# Patient Record
Sex: MALE | Race: WHITE
[De-identification: names, ages, dates, MRNs, and addresses within clinical notes are randomized per-mention and may not be internally consistent; named-entity substitution may affect disease eponyms.]

---

## 2022-07-07 ENCOUNTER — HOSPITAL ENCOUNTER (OUTPATIENT)
Dept: HOSPITAL 76 - EMS | Age: 77
End: 2022-07-07
Payer: MEDICARE
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
## 2022-07-07 DIAGNOSIS — M54.9: Primary | ICD-10-CM

## 2022-09-05 ENCOUNTER — HOSPITAL ENCOUNTER (OUTPATIENT)
Dept: HOSPITAL 76 - EMS | Age: 77
Discharge: ANOTHER HOSPITAL | End: 2022-09-05
Payer: MEDICARE

## 2022-09-05 ENCOUNTER — HOSPITAL ENCOUNTER (OUTPATIENT)
Dept: HOSPITAL 76 - EMS | Age: 77
End: 2022-09-05
Payer: MEDICARE

## 2022-09-05 ENCOUNTER — HOSPITAL ENCOUNTER (EMERGENCY)
Dept: HOSPITAL 76 - ED | Age: 77
Discharge: HOME | End: 2022-09-05
Payer: MEDICARE

## 2022-09-05 VITALS — DIASTOLIC BLOOD PRESSURE: 71 MMHG | SYSTOLIC BLOOD PRESSURE: 100 MMHG

## 2022-09-05 DIAGNOSIS — R42: Primary | ICD-10-CM

## 2022-09-05 DIAGNOSIS — R55: Primary | ICD-10-CM

## 2022-09-05 DIAGNOSIS — R63.4: ICD-10-CM

## 2022-09-05 DIAGNOSIS — R33.9: ICD-10-CM

## 2022-09-05 DIAGNOSIS — I10: ICD-10-CM

## 2022-09-05 LAB
ALBUMIN DIAFP-MCNC: 3.8 G/DL (ref 3.2–5.5)
ALBUMIN/GLOB SERPL: 1.1 {RATIO} (ref 1–2.2)
ALP SERPL-CCNC: 69 IU/L (ref 42–121)
ALT SERPL W P-5'-P-CCNC: 31 IU/L (ref 10–60)
ANION GAP SERPL CALCULATED.4IONS-SCNC: 8 MMOL/L (ref 6–13)
AST SERPL W P-5'-P-CCNC: 34 IU/L (ref 10–42)
BASOPHILS NFR BLD AUTO: 0 10^3/UL (ref 0–0.1)
BASOPHILS NFR BLD AUTO: 0.6 %
BILIRUB BLD-MCNC: 0.8 MG/DL (ref 0.2–1)
BUN SERPL-MCNC: 19 MG/DL (ref 6–20)
CALCIUM UR-MCNC: 9.1 MG/DL (ref 8.5–10.3)
CHLORIDE SERPL-SCNC: 97 MMOL/L (ref 101–111)
CLARITY UR REFRACT.AUTO: CLEAR
CO2 SERPL-SCNC: 25 MMOL/L (ref 21–32)
CREAT SERPLBLD-SCNC: 0.8 MG/DL (ref 0.6–1.2)
EOSINOPHIL # BLD AUTO: 0.1 10^3/UL (ref 0–0.7)
EOSINOPHIL NFR BLD AUTO: 2.6 %
ERYTHROCYTE [DISTWIDTH] IN BLOOD BY AUTOMATED COUNT: 15.1 % (ref 12–15)
GFRSERPLBLD MDRD-ARVRAT: 94 ML/MIN/{1.73_M2} (ref 89–?)
GLOBULIN SER-MCNC: 3.6 G/DL (ref 2.1–4.2)
GLUCOSE SERPL-MCNC: 103 MG/DL (ref 70–100)
GLUCOSE UR QL STRIP.AUTO: NEGATIVE MG/DL
HCT VFR BLD AUTO: 29.8 % (ref 42–52)
HGB UR QL STRIP: 10.2 G/DL (ref 14–18)
KETONES UR QL STRIP.AUTO: NEGATIVE MG/DL
LIPASE SERPL-CCNC: 37 U/L (ref 22–51)
LYMPHOCYTES # SPEC AUTO: 0.6 10^3/UL (ref 1.5–3.5)
LYMPHOCYTES NFR BLD AUTO: 17.5 %
MCH RBC QN AUTO: 37.2 PG (ref 27–31)
MCHC RBC AUTO-ENTMCNC: 34.2 G/DL (ref 32–36)
MCV RBC AUTO: 108.8 FL (ref 80–94)
MONOCYTES # BLD AUTO: 0.7 10^3/UL (ref 0–1)
MONOCYTES NFR BLD AUTO: 19.5 %
NEUTROPHILS # BLD AUTO: 2.1 10^3/UL (ref 1.5–6.6)
NEUTROPHILS # SNV AUTO: 3.5 X10^3/UL (ref 4.8–10.8)
NEUTROPHILS NFR BLD AUTO: 59.8 %
NITRITE UR QL STRIP.AUTO: NEGATIVE
NRBC # BLD AUTO: 0 /100WBC
NRBC # BLD AUTO: 0 X10^3/UL
PDW BLD AUTO: 9 FL (ref 7.4–11.4)
PH UR STRIP.AUTO: 6 PH (ref 5–7.5)
PLATELET # BLD: 178 10^3/UL (ref 130–450)
POTASSIUM SERPL-SCNC: 4 MMOL/L (ref 3.5–5)
PROT SPEC-MCNC: 7.4 G/DL (ref 6.7–8.2)
PROT UR STRIP.AUTO-MCNC: NEGATIVE MG/DL
RBC # UR STRIP.AUTO: NEGATIVE /UL
RBC MAR: 2.74 10^6/UL (ref 4.7–6.1)
SODIUM SERPLBLD-SCNC: 130 MMOL/L (ref 135–145)
SP GR UR STRIP.AUTO: 1.01 (ref 1–1.03)
UROBILINOGEN UR QL STRIP.AUTO: (no result) E.U./DL
UROBILINOGEN UR STRIP.AUTO-MCNC: NEGATIVE MG/DL

## 2022-09-05 PROCEDURE — 83690 ASSAY OF LIPASE: CPT

## 2022-09-05 PROCEDURE — 84484 ASSAY OF TROPONIN QUANT: CPT

## 2022-09-05 PROCEDURE — 87086 URINE CULTURE/COLONY COUNT: CPT

## 2022-09-05 PROCEDURE — 85025 COMPLETE CBC W/AUTO DIFF WBC: CPT

## 2022-09-05 PROCEDURE — 83735 ASSAY OF MAGNESIUM: CPT

## 2022-09-05 PROCEDURE — 84443 ASSAY THYROID STIM HORMONE: CPT

## 2022-09-05 PROCEDURE — 85379 FIBRIN DEGRADATION QUANT: CPT

## 2022-09-05 PROCEDURE — 71275 CT ANGIOGRAPHY CHEST: CPT

## 2022-09-05 PROCEDURE — 51798 US URINE CAPACITY MEASURE: CPT

## 2022-09-05 PROCEDURE — 99284 EMERGENCY DEPT VISIT MOD MDM: CPT

## 2022-09-05 PROCEDURE — 81001 URINALYSIS AUTO W/SCOPE: CPT

## 2022-09-05 PROCEDURE — 93005 ELECTROCARDIOGRAM TRACING: CPT

## 2022-09-05 PROCEDURE — 36415 COLL VENOUS BLD VENIPUNCTURE: CPT

## 2022-09-05 PROCEDURE — 80053 COMPREHEN METABOLIC PANEL: CPT

## 2022-09-05 PROCEDURE — 83880 ASSAY OF NATRIURETIC PEPTIDE: CPT

## 2022-09-05 PROCEDURE — 81003 URINALYSIS AUTO W/O SCOPE: CPT

## 2022-09-05 PROCEDURE — 99283 EMERGENCY DEPT VISIT LOW MDM: CPT

## 2022-09-05 NOTE — ED PHYSICIAN DOCUMENTATION
PD HPI SYNCOPE





- Stated complaint


Stated Complaint: DIZZINESS





- Chief complaint


Chief Complaint: Neuro





- History obtained from


History obtained from: Patient, EMS





- History of Present Illness


Witnessed: Witnessed (his wife says she had seem couple of the events, where 

patient is looking pale, not interacting well, but standing and trying to walk 

and then collapses. She has helped him down gently. No noted injuries. He is not

having CP/AP/HA with the episodes.)


Timing - onset: Last night (had syncope after urination last night and again 

this morning. Has had 2 other episodes (one in July and one in Aug) both 

associated with just urinated during night.)


Contributing factors: No: Recent med change (had Ofev added end of June for 

ulmonary fibrosis.)


Injury occurred: No: Fell, Head injury, Neck injury


Recently seen: Clinic (seen for pulmonary fibrosis and on Ofev (fibroblast 

inhibitor) since end June. Saw Cardiologist prior to shoulder surgery and had 

ECHO/stress testing that were normal.), Surgery (right shoulder replacement une 

12, 2020. Has had regular progressive rehab/ROM.)





Review of Systems


Constitutional: denies: Fever, Chills


Eyes: denies: Loss of vision


Nose: denies: Rhinorrhea / runny nose


Throat: denies: Sore throat


Cardiac: denies: Chest pain / pressure, Palpitations, Pedal edema, Calf pain


Respiratory: denies: Dyspnea, Cough


GI: denies: Abdominal Pain, Nausea, Vomiting, Diarrhea, Bloody / black stool


: reports: Frequency, Hesitancy


Skin: denies: Rash


Neurologic: reports: Syncope (just the distinct 4 episodes without any near 

syncope. And each has been just after urination in the night/AM. No feelings of 

it during day.).  denies: Generalized weakness, Focal weakness, Numbness


Endocrine: reports: Weight loss (about 70 lbs in 2 years, with abrupt onset 30 

lb weight loss in 2-3 months and then has been steady regular loss since. 

Started at 220 and is currently 150.)





PD PAST MEDICAL HISTORY





- Past Medical History


Past Medical History: Yes


Cardiovascular: Hypertension (prior history of it but has had improved BP to 

even slightly low at times. Had Lisinopril dose lowered but is still on some 

dose. No BPH meds. )


Respiratory: Other (pulmonary fibrosis. )


Neuro: None


Endocrine/Autoimmune: None


GI: None


: Other (urinary hesitancy, some feeling of incomplete emptying. Urinated 

standing. )


Other Past Medical History: Pulmonary Fibrosis





- Past Surgical History


Past Surgical History: Yes


Ortho: Other (shoulder replacement June 12, 2022)





- Present Medications


Home Medications: 


                                Ambulatory Orders











 Medication  Instructions  Recorded  Confirmed


 


Alfuzosin HCl [Alfuzosin HCl ER] 10 mg PO DAILY 20 Days #20 tab 09/05/22 


 


Nintedanib Esylate [Ofev] 100 mg PO DAILY 09/05/22 09/05/22


 


lisinopriL [Zestril] 5 mg PO DAILY 09/05/22 09/05/22














- Allergies


Allergies/Adverse Reactions: 


                                    Allergies











Allergy/AdvReac Type Severity Reaction Status Date / Time


 


No Known Drug Allergies Allergy   Verified 09/05/22 06:39














- Social History


Does the pt smoke?: No


Smoking Status: Never smoker


Does the pt drink ETOH?: Yes


ETOH Use: Wine


Does the pt have substance abuse?: No





- Immunizations


Immunizations are current?: Yes





- POLST


Patient has POLST: No





PD ED PE NORMAL





- Vitals


Vital signs reviewed: Yes





- General


General: Alert and oriented X 3, No acute distress, Well developed/nourished





- HEENT


HEENT: Moist mucous membranes, Pharynx benign





- Neck


Neck: Supple, no meningeal sign, No adenopathy





- Cardiac


Cardiac: RRR, No murmur





- Respiratory


Respiratory: No respiratory distress, Other (no wheezing. Mild fine dry crackles

 sound diffusely. No rales. )





- Abdomen


Abdomen: Soft, Non tender





- Male 


Male : Deferred





- Rectal


Rectal: Deferred





- Derm


Derm: Normal color, Warm and dry





- Extremities


Extremities: Normal ROM s pain, No edema, No calf tenderness / cord





- Neuro


Neuro: Alert and oriented X 3, No motor deficit, No sensory deficit, Normal 

speech


Eye Opening: Spontaneous


Motor: Obeys Commands


Verbal: Oriented


GCS Score: 15





Results





- Vitals


Vitals: 


                               Vital Signs - 24 hr











  09/05/22 09/05/22 09/05/22





  06:34 08:00 10:52


 


Temperature 37.3 C  


 


Heart Rate 94 90 90


 


Respiratory 20 47 H 39 H





Rate   


 


Blood Pressure 130/93 H 115/76 100/71


 


O2 Saturation 98 100 99








                                     Oxygen











O2 Source                      Room air

















- EKG (time done)


  ** 06:39


Rate: Rate (enter#) (92)


Rhythm: NSR


Intervals: Wide QRS (IVCD with nonspecific ST/T changes)


QRS: Poor R wave progression


Compare to prior EKG: Old EKG unavailable





- Labs


Labs: 


                                Laboratory Tests











  09/05/22 09/05/22 09/05/22





  06:42 06:42 06:42


 


WBC  3.5 L  


 


RBC  2.74 L  


 


Hgb  10.2 L  


 


Hct  29.8 L  


 


MCV  108.8 H  


 


MCH  37.2 H  


 


MCHC  34.2  


 


RDW  15.1 H  


 


Plt Count  178  


 


MPV  9.0  


 


Neut # (Auto)  2.1  


 


Lymph # (Auto)  0.6 L  


 


Mono # (Auto)  0.7  


 


Eos # (Auto)  0.1  


 


Baso # (Auto)  0.0  


 


Absolute Nucleated RBC  0.00  


 


Nucleated RBC %  0.0  


 


D-Dimer   


 


Sodium   130 L 


 


Potassium   4.0 


 


Chloride   97 L 


 


Carbon Dioxide   25 


 


Anion Gap   8.0 


 


BUN   19 


 


Creatinine   0.8 


 


Estimated GFR (MDRD)   94 


 


Glucose   103 H 


 


POC Whole Bld Glucose   


 


Calcium   9.1 


 


Magnesium   


 


Total Bilirubin   0.8 


 


AST   34 


 


ALT   31 


 


Alkaline Phosphatase   69 


 


Troponin I High Sens    50.1 H*


 


B-Natriuretic Peptide   


 


Total Protein   7.4 


 


Albumin   3.8 


 


Globulin   3.6 


 


Albumin/Globulin Ratio   1.1 


 


Lipase   37 


 


TSH   


 


Urine Color   


 


Urine Clarity   


 


Urine pH   


 


Ur Specific Gravity   


 


Urine Protein   


 


Urine Glucose (UA)   


 


Urine Ketones   


 


Urine Occult Blood   


 


Urine Nitrite   


 


Urine Bilirubin   


 


Urine Urobilinogen   


 


Ur Leukocyte Esterase   


 


Ur Microscopic Review   


 


Urine Culture Comments   














  09/05/22 09/05/22 09/05/22





  06:43 07:56 07:56


 


WBC   


 


RBC   


 


Hgb   


 


Hct   


 


MCV   


 


MCH   


 


MCHC   


 


RDW   


 


Plt Count   


 


MPV   


 


Neut # (Auto)   


 


Lymph # (Auto)   


 


Mono # (Auto)   


 


Eos # (Auto)   


 


Baso # (Auto)   


 


Absolute Nucleated RBC   


 


Nucleated RBC %   


 


D-Dimer   


 


Sodium   


 


Potassium   


 


Chloride   


 


Carbon Dioxide   


 


Anion Gap   


 


BUN   


 


Creatinine   


 


Estimated GFR (MDRD)   


 


Glucose   


 


POC Whole Bld Glucose  101 H  


 


Calcium   


 


Magnesium   2.1 


 


Total Bilirubin   


 


AST   


 


ALT   


 


Alkaline Phosphatase   


 


Troponin I High Sens   


 


B-Natriuretic Peptide    280 H


 


Total Protein   


 


Albumin   


 


Globulin   


 


Albumin/Globulin Ratio   


 


Lipase   


 


TSH   


 


Urine Color   


 


Urine Clarity   


 


Urine pH   


 


Ur Specific Gravity   


 


Urine Protein   


 


Urine Glucose (UA)   


 


Urine Ketones   


 


Urine Occult Blood   


 


Urine Nitrite   


 


Urine Bilirubin   


 


Urine Urobilinogen   


 


Ur Leukocyte Esterase   


 


Ur Microscopic Review   


 


Urine Culture Comments   














  09/05/22 09/05/22 09/05/22





  07:56 07:56 07:56


 


WBC   


 


RBC   


 


Hgb   


 


Hct   


 


MCV   


 


MCH   


 


MCHC   


 


RDW   


 


Plt Count   


 


MPV   


 


Neut # (Auto)   


 


Lymph # (Auto)   


 


Mono # (Auto)   


 


Eos # (Auto)   


 


Baso # (Auto)   


 


Absolute Nucleated RBC   


 


Nucleated RBC %   


 


D-Dimer  611.0 H  


 


Sodium   


 


Potassium   


 


Chloride   


 


Carbon Dioxide   


 


Anion Gap   


 


BUN   


 


Creatinine   


 


Estimated GFR (MDRD)   


 


Glucose   


 


POC Whole Bld Glucose   


 


Calcium   


 


Magnesium   


 


Total Bilirubin   


 


AST   


 


ALT   


 


Alkaline Phosphatase   


 


Troponin I High Sens   49.2 H* 


 


B-Natriuretic Peptide   


 


Total Protein   


 


Albumin   


 


Globulin   


 


Albumin/Globulin Ratio   


 


Lipase   


 


TSH    1.81


 


Urine Color   


 


Urine Clarity   


 


Urine pH   


 


Ur Specific Gravity   


 


Urine Protein   


 


Urine Glucose (UA)   


 


Urine Ketones   


 


Urine Occult Blood   


 


Urine Nitrite   


 


Urine Bilirubin   


 


Urine Urobilinogen   


 


Ur Leukocyte Esterase   


 


Ur Microscopic Review   


 


Urine Culture Comments   














  09/05/22





  08:33


 


WBC 


 


RBC 


 


Hgb 


 


Hct 


 


MCV 


 


MCH 


 


MCHC 


 


RDW 


 


Plt Count 


 


MPV 


 


Neut # (Auto) 


 


Lymph # (Auto) 


 


Mono # (Auto) 


 


Eos # (Auto) 


 


Baso # (Auto) 


 


Absolute Nucleated RBC 


 


Nucleated RBC % 


 


D-Dimer 


 


Sodium 


 


Potassium 


 


Chloride 


 


Carbon Dioxide 


 


Anion Gap 


 


BUN 


 


Creatinine 


 


Estimated GFR (MDRD) 


 


Glucose 


 


POC Whole Bld Glucose 


 


Calcium 


 


Magnesium 


 


Total Bilirubin 


 


AST 


 


ALT 


 


Alkaline Phosphatase 


 


Troponin I High Sens 


 


B-Natriuretic Peptide 


 


Total Protein 


 


Albumin 


 


Globulin 


 


Albumin/Globulin Ratio 


 


Lipase 


 


TSH 


 


Urine Color  YELLOW


 


Urine Clarity  CLEAR


 


Urine pH  6.0


 


Ur Specific Gravity  1.010


 


Urine Protein  NEGATIVE


 


Urine Glucose (UA)  NEGATIVE


 


Urine Ketones  NEGATIVE


 


Urine Occult Blood  NEGATIVE


 


Urine Nitrite  NEGATIVE


 


Urine Bilirubin  NEGATIVE


 


Urine Urobilinogen  0.2 (NORMAL)


 


Ur Leukocyte Esterase  NEGATIVE


 


Ur Microscopic Review  NOT INDICATED


 


Urine Culture Comments  NOT INDICATED














- Rads (name of study)


  ** chest CTA


Radiology: Prelim report reviewed (no emboli. fibrosis changes noted. ), See rad

 report





PD MEDICAL DECISION MAKING





- ED course


Complexity details: reviewed results (some troponin leak/bump that is flat on 

repeat, most c/w tranient hypotension or perhaps dysrhythmia. Does not seem like

 AMI. ECG showing IVCD, and no priors available. But patient had had 

ECHO/nuclear stress normal less than 3 months ago. ), re-evaluated patient (had 

urinary retention and likely chronic. Post void is not too bad at 249 ml, so 

defer aceves for now. ), considered differential (micturation syncope. Has had 4 

distinct episodes without lightheaded other times urinating. No CP. Has abnormal

 ECG IVCD, but had normal ECHO/stress test in May 2022 pre-op for shoulder.  No 

exertional CP but does have dyspnea. Has BPH symptoms. ), d/w patient


ED course: 





He has had 4 distinct episodes of micturition syncope during the night.  He 

states his blood pressure has been running lower the last 6 months or more and 

is still on lisinopril low-dose.  He does not take any prostate medicines but 

describes incomplete emptying and frequency.  I presume he is having a more 

pronounced vasovagal symptoms with urination on those particular episodes.  He 

has not had any episodes without urination.





At this point I would consider stopping his lisinopril.  I would suggest he 

urinate during the night in a sitting position rather than standing and wait a 

few minutes after before getting up.





These episodes have happened postoperatively for shoulder repair.  He has not 

had any leg edema or swelling but consider episodic PEs.  He has underlying 

dyspnea from the fibrosis so could be harder to tell.  Can test for those.  Or 

screen for it with the D-dimer.  We will also screen for heart failure or heart 

attack though less likely in the setting of recent stress test etc.





He has had weight loss over the last couple of years or so.  He has had basic 

normal testing for metabolic problems.  I would suggest he follow-up with his 

primary care for malabsorption syndrome type issues.





The distinct episodes of fainting may also suggest the idea of a Holter or Zio 

patch type monitoring.  He is only had a few episodes scattered to a month apart

 so would need to be a longer term 1 month type patch or so.  It seems unusual 

that these would have happened just with urination but intermittent dysrhythmia 

would still be a consideration.





The initial troponin was slightly elevated at 50 but in the setting of a 

syncopal episode, could be just a myocardial leak from low blood pressure.  We 

will recheck it at an interval of 1 to 2 hours and see if its a dramatic rise.





Departure





- Departure


Disposition: 01 Home, Self Care


Clinical Impression: 


 Micturition syncope, Urinary retention, Weight loss





Syncopal episodes


Qualifiers:


 Syncope type: vasovagal syncope Qualified Code(s): R55 - Syncope and collapse





Condition: Stable


Record reviewed to determine appropriate education?: Yes


Instructions:  ED Fainting Unkn Cause


Follow-Up: 


Alejandrina Terry MD [Primary Care Provider] - 


Gadiel Ramos MD [Physician No Access] - 


Prescriptions: 


Alfuzosin HCl [Alfuzosin HCl ER] 10 mg PO DAILY 20 Days #20 tab


Comments: 


Your CT scan shows the fibrosis that is known.  No signs of blood clots.  The 

radiologist does make mention of an enlarged pulmonary artery that may be 

suggestive of pulmonary artery hypertension.  That should have been noted on 

your echocardiogram done by the cardiologist preop.  You can have your primary 

care track down the echo report and see if there was any measured pulmonary 

artery hypertension.  Otherwise consider likely a normal variant.





The fainting episodes coming along with after urinating are likely a feedback 

response from the pressure in the bladder being released causing a transient 

drop in blood pressure or heart rate (called a vasovagal response).





This will cause a diminished blood flow and blood pressure transiently and 

therefore fainting and can even cause a mild stress on the other organs (as 

evidenced by a slight bump in your troponin heart muscle marker on blood tests 

here).





I would have several suggestions:


1.  I would stop your lisinopril as your blood pressure sounds fine and does not

need the added effect of lowering blood pressure


2.  I would suggest urinating during the night sitting down and waiting for a 

few minutes before getting up to diminish the potential for the drop in blood 

pressure in the "gravity effect" of standing up.


3.:  You do have urinary retention and incomplete emptying which may be adding 

to the effect at times.  I would suggest starting a low-dose prostate medicine 

until following up with your primary care to further investigate.  You could 

also follow-up with a urologist for further evaluation.





Regarding the weight loss, consideration could be for dietary malabsorption and 

you could discuss with your primary care whether to do some stool studies or 

further evaluation to see if there is conditions such as gluten intolerance, 

fatty stool, inflammatory conditions such as Crohn's or colitis etc.


Discharge Date/Time: 09/05/22 10:53

## 2022-09-05 NOTE — CT REPORT
PROCEDURE:  ANGIO CHEST W/WO

 

INDICATIONS:  syncope twice last night; elevated d-dimer

 

CONTRAST:  IV CONTRAST: Optiray 320 ml: 80 PO CONTRAST: *NO PO CONTRAST  

 

TECHNIQUE:  

After the administration of intravenous contrast, 2 mm axial images were acquired from the pulmonary 
apices to the posterior costophrenic angles during the arterial phase. In addition, 1 mm lung kernel 
and 5 mm soft tissue kernel reconstructions were performed. 3-dimensional coronal oblique maximum int
ensity projection (MIP) reformats, 8 mm axial MIP, and 5 mm coronal and sagittal MPR reformats were t
hen performed through the thorax. For radiation dose reduction, the following was used: automated exp
osure control, adjustment of mA and/or kV according to patient size.

 

COMPARISON:  None.

 

FINDINGS:  

Image quality:  Excellent.  

 

Pulmonary arteries:  Main pulmonary artery is enlarged, measuring up to 4.3 cm in diameter. No intral
uminal filling defects to suggest central pulmonary embolism.  

 

Lungs and pleura: There is elevation of the left hemidiaphragm. Bilateral subpleural reticulations an
d traction bronchiectasis are seen. Areas of subsegmental atelectasis are also noted. No definite hon
eycombing is seen. However, finding are mildly obscured by respiratory motion. No pleural effusions o
r pneumothorax.  Central and peripheral airways are patent.  

 

Mediastinum:  Heart size is normal, without pericardial effusion. Mild coronary artery calcifications
. No mediastinal or hilar adenopathy.  Thoracic aorta is normal in caliber and enhancement.  Esophagu
s is normal in caliber, without hiatal hernia.  

 

Bones and chest wall: Postoperative changes are seen from a reverse right total shoulder arthroplasty
 with associated metallic streak artifact. Degenerative changes are seen in the spine including multi
level anterior bridging osteophytes or syndesmophytes. Scoliotic curvature of the thoracolumbar spine
. No suspicious bony lesions.  Ribs and thoracic spine appear intact throughout.  No axillary or supr
aclavicular adenopathy.  The thyroid is normal in size and there are no incidental findings.

 

Abdomen:  Visualized upper abdominal solid organs are unremarkable.

 

IMPRESSION:  

1.No acute pulmonary embolus.

2.Bilateral subpleural reticulations and traction bronchiectasis are suspicious for chronic interstit
ial lung disease.

3.Main pulmonary artery is enlarged 4.3 cm, which can be seen in setting of pulmonary arterial hypert
ension.

 

Reviewed by: Manas Adame MD on 9/5/2022 9:14 AM [facility]ENCE

Approved by: Manas Adame MD on 9/5/2022 9:14 AM ESSENCE

 

 

Station ID:  [id number]

## 2022-12-21 ENCOUNTER — HOSPITAL ENCOUNTER (OUTPATIENT)
Dept: HOSPITAL 76 - EMS | Age: 77
End: 2022-12-21
Payer: MEDICARE

## 2022-12-21 DIAGNOSIS — Z03.89: Primary | ICD-10-CM

## 2023-01-14 ENCOUNTER — HOSPITAL ENCOUNTER (OUTPATIENT)
Dept: HOSPITAL 76 - EMS | Age: 78
Discharge: ANOTHER HOSPITAL | End: 2023-01-14
Payer: MEDICARE

## 2023-01-14 ENCOUNTER — HOSPITAL ENCOUNTER (INPATIENT)
Dept: HOSPITAL 76 - ED | Age: 78
LOS: 2 days | Discharge: HOME | DRG: 312 | End: 2023-01-16
Attending: INTERNAL MEDICINE | Admitting: INTERNAL MEDICINE
Payer: MEDICARE

## 2023-01-14 DIAGNOSIS — I27.29: ICD-10-CM

## 2023-01-14 DIAGNOSIS — I11.0: Primary | ICD-10-CM

## 2023-01-14 DIAGNOSIS — I27.81: ICD-10-CM

## 2023-01-14 DIAGNOSIS — I45.10: ICD-10-CM

## 2023-01-14 DIAGNOSIS — I95.1: ICD-10-CM

## 2023-01-14 DIAGNOSIS — L89.619: ICD-10-CM

## 2023-01-14 DIAGNOSIS — R55: Primary | ICD-10-CM

## 2023-01-14 DIAGNOSIS — Z66: ICD-10-CM

## 2023-01-14 DIAGNOSIS — R39.14: ICD-10-CM

## 2023-01-14 DIAGNOSIS — N40.1: ICD-10-CM

## 2023-01-14 DIAGNOSIS — Z20.822: ICD-10-CM

## 2023-01-14 DIAGNOSIS — J84.10: ICD-10-CM

## 2023-01-14 DIAGNOSIS — L89.629: ICD-10-CM

## 2023-01-14 DIAGNOSIS — F10.10: ICD-10-CM

## 2023-01-14 DIAGNOSIS — K21.9: ICD-10-CM

## 2023-01-14 DIAGNOSIS — L89.329: ICD-10-CM

## 2023-01-14 DIAGNOSIS — D53.9: ICD-10-CM

## 2023-01-14 DIAGNOSIS — I11.9: ICD-10-CM

## 2023-01-14 DIAGNOSIS — D61.818: ICD-10-CM

## 2023-01-14 DIAGNOSIS — I50.9: ICD-10-CM

## 2023-01-14 DIAGNOSIS — R94.31: ICD-10-CM

## 2023-01-14 LAB
ALBUMIN DIAFP-MCNC: 3.3 G/DL (ref 3.2–5.5)
ALBUMIN/GLOB SERPL: 1 {RATIO} (ref 1–2.2)
ALP SERPL-CCNC: 69 IU/L (ref 42–121)
ALT SERPL W P-5'-P-CCNC: 30 IU/L (ref 10–60)
ANION GAP SERPL CALCULATED.4IONS-SCNC: 8 MMOL/L (ref 6–13)
AST SERPL W P-5'-P-CCNC: 36 IU/L (ref 10–42)
BASOPHILS NFR BLD AUTO: 0 10^3/UL (ref 0–0.1)
BASOPHILS NFR BLD AUTO: 0.6 %
BILIRUB BLD-MCNC: 1 MG/DL (ref 0.2–1)
BUN SERPL-MCNC: 29 MG/DL (ref 6–20)
CALCIUM UR-MCNC: 8.5 MG/DL (ref 8.5–10.3)
CASTS URNS QL MICRO: (no result) /LPF
CHLORIDE SERPL-SCNC: 108 MMOL/L (ref 101–111)
CLARITY UR REFRACT.AUTO: CLEAR
CO2 SERPL-SCNC: 22 MMOL/L (ref 21–32)
CREAT SERPLBLD-SCNC: 1.2 MG/DL (ref 0.6–1.2)
EOSINOPHIL # BLD AUTO: 0.1 10^3/UL (ref 0–0.7)
EOSINOPHIL NFR BLD AUTO: 2.9 %
ERYTHROCYTE [DISTWIDTH] IN BLOOD BY AUTOMATED COUNT: 16.3 % (ref 12–15)
GFRSERPLBLD MDRD-ARVRAT: 59 ML/MIN/{1.73_M2} (ref 89–?)
GLOBULIN SER-MCNC: 3.4 G/DL (ref 2.1–4.2)
GLUCOSE SERPL-MCNC: 154 MG/DL (ref 70–100)
GLUCOSE UR QL STRIP.AUTO: NEGATIVE MG/DL
HCT VFR BLD AUTO: 34.2 % (ref 42–52)
HGB UR QL STRIP: 11 G/DL (ref 14–18)
KETONES UR QL STRIP.AUTO: NEGATIVE MG/DL
LIPASE SERPL-CCNC: 54 U/L (ref 22–51)
LYMPHOCYTES # SPEC AUTO: 0.7 10^3/UL (ref 1.5–3.5)
LYMPHOCYTES NFR BLD AUTO: 21.7 %
MCH RBC QN AUTO: 38.6 PG (ref 27–31)
MCHC RBC AUTO-ENTMCNC: 32.2 G/DL (ref 32–36)
MCV RBC AUTO: 120 FL (ref 80–94)
MONOCYTES # BLD AUTO: 0.3 10^3/UL (ref 0–1)
MONOCYTES NFR BLD AUTO: 11 %
NEUTROPHILS # BLD AUTO: 2 10^3/UL (ref 1.5–6.6)
NEUTROPHILS # SNV AUTO: 3.1 X10^3/UL (ref 4.8–10.8)
NEUTROPHILS NFR BLD AUTO: 63.8 %
NITRITE UR QL STRIP.AUTO: NEGATIVE
NRBC # BLD AUTO: 0 /100WBC
NRBC # BLD AUTO: 0 X10^3/UL
PDW BLD AUTO: 10 FL (ref 7.4–11.4)
PH UR STRIP.AUTO: 5.5 PH (ref 5–7.5)
PLATELET # BLD: 119 10^3/UL (ref 130–450)
PLATELET BLD QL SMEAR: (no result)
POTASSIUM SERPL-SCNC: 4.5 MMOL/L (ref 3.5–5)
PROT SPEC-MCNC: 6.7 G/DL (ref 6.7–8.2)
PROT UR STRIP.AUTO-MCNC: NEGATIVE MG/DL
RBC # UR STRIP.AUTO: NEGATIVE /UL
RBC MAR: 2.85 10^6/UL (ref 4.7–6.1)
RBC MORPH BLD: (no result)
SODIUM SERPLBLD-SCNC: 138 MMOL/L (ref 135–145)
SP GR UR STRIP.AUTO: 1.01 (ref 1–1.03)
SQUAMOUS URNS QL MICRO: (no result)
UROBILINOGEN UR QL STRIP.AUTO: (no result) E.U./DL
UROBILINOGEN UR STRIP.AUTO-MCNC: NEGATIVE MG/DL
WBC # UR MANUAL: (no result) /HPF (ref 0–3)

## 2023-01-14 PROCEDURE — 81001 URINALYSIS AUTO W/SCOPE: CPT

## 2023-01-14 PROCEDURE — 82746 ASSAY OF FOLIC ACID SERUM: CPT

## 2023-01-14 PROCEDURE — 99285 EMERGENCY DEPT VISIT HI MDM: CPT

## 2023-01-14 PROCEDURE — 85610 PROTHROMBIN TIME: CPT

## 2023-01-14 PROCEDURE — 80048 BASIC METABOLIC PNL TOTAL CA: CPT

## 2023-01-14 PROCEDURE — 94761 N-INVAS EAR/PLS OXIMETRY MLT: CPT

## 2023-01-14 PROCEDURE — 84484 ASSAY OF TROPONIN QUANT: CPT

## 2023-01-14 PROCEDURE — 87635 SARS-COV-2 COVID-19 AMP PRB: CPT

## 2023-01-14 PROCEDURE — 82607 VITAMIN B-12: CPT

## 2023-01-14 PROCEDURE — 97163 PT EVAL HIGH COMPLEX 45 MIN: CPT

## 2023-01-14 PROCEDURE — 85025 COMPLETE CBC W/AUTO DIFF WBC: CPT

## 2023-01-14 PROCEDURE — 87086 URINE CULTURE/COLONY COUNT: CPT

## 2023-01-14 PROCEDURE — 71045 X-RAY EXAM CHEST 1 VIEW: CPT

## 2023-01-14 PROCEDURE — 96374 THER/PROPH/DIAG INJ IV PUSH: CPT

## 2023-01-14 PROCEDURE — 83735 ASSAY OF MAGNESIUM: CPT

## 2023-01-14 PROCEDURE — 84466 ASSAY OF TRANSFERRIN: CPT

## 2023-01-14 PROCEDURE — 83540 ASSAY OF IRON: CPT

## 2023-01-14 PROCEDURE — 80053 COMPREHEN METABOLIC PANEL: CPT

## 2023-01-14 PROCEDURE — 36415 COLL VENOUS BLD VENIPUNCTURE: CPT

## 2023-01-14 PROCEDURE — 83880 ASSAY OF NATRIURETIC PEPTIDE: CPT

## 2023-01-14 PROCEDURE — 93005 ELECTROCARDIOGRAM TRACING: CPT

## 2023-01-14 PROCEDURE — 83690 ASSAY OF LIPASE: CPT

## 2023-01-14 RX ADMIN — MIDODRINE HYDROCHLORIDE SCH MG: 2.5 TABLET ORAL at 17:26

## 2023-01-14 RX ADMIN — DILTIAZEM HYDROCHLORIDE SCH EACH: 120 CAPSULE, COATED, EXTENDED RELEASE ORAL at 17:25

## 2023-01-14 RX ADMIN — SODIUM CHLORIDE, PRESERVATIVE FREE SCH ML: 5 INJECTION INTRAVENOUS at 17:47

## 2023-01-14 NOTE — ED PHYSICIAN DOCUMENTATION
PD HPI SYNCOPE





- Stated complaint


Stated Complaint: SYNCOPE





- Chief complaint


Chief Complaint: Neuro





- History obtained from


History obtained from: Patient, Family





- History of Present Illness


Witnessed: Witnessed


Timing - onset: How many minutes ago (approximately 30 minutes PTA)


Duration: Minutes


Preceding symptoms: None


Associated symptoms: None


Contributing factors: Other (had just sat up but not yet stood (see narrative 

below))


Injury occurred: None


Pain level max: 0


Pain level now: 0


Recently seen: Not recently seen





- Additional information


Additional information: 





HPI is from patient as well as from patient's wife (who is in the ED at 

patient's bedside).  Information from patient's wife is helpful due to patient 

having had complete LOC and thus cannot recall the entirety of the events but 

resulted in this visit.


Patient's wife called 911 due to patient having a syncopal episode.  Patient and

his wife say that he has had many episodes of syncope and near syncope in the 

past and thus knows that when he goes to stand up from lying down, he needs to 

first sit up on the edge of the bed and wait for 1 to 2 minutes to make sure he 

Is not feeling lightheaded.  He then normally would transition over to a bedside

commode.


The patient does recall having the urge to urinate and sitting up on the edge of

the bed.  The patient says the next thing he remembers, is the EMTs standing 

over him.  Patient's wife says that she was in bed next to the patient when she 

heard the sound of his cane being knocked over; she looked over to find the 

patient was on the edge of the bed hunched over and barely able to support 

himself.  She thus came around to the side of the bed to help support patient, 

noting that he was barely responding verbally and that he felt like he was "dead

weight".  She says that very rapidly he became unresponsive as she maneuvered 

him to gradually get him back into the bed with his legs back into the bed as 

well.  She then called 911 and while waiting for EMS was instructed by 911 to 

start CPR, which the patient's wife did.


EMS arrived to find patient was awake and alert and responding with blood pr

essures in the 100-110 range (systolic) and regular rate and rhythm on cardiac 

monitor, heart rate 60s-70s. FSBS by EMS was 121.


On my HPI, patient says he feels like he is at baseline regarding ongoing 

symptoms (such as dyspnea associated with his pulmonary fibrosis). He denies 

headache, neck pain, chest pain.


Patient was treated released from this emergency department September 2022 for 

similar symptoms with testing at that time that was unremarkable and 

nondiagnostic.  Notably, at that time, he had a mildly elevated troponin which 

was unchanged on a 2-hour repeat.


Patient wife notes that he has had a new onset of slowly progressive bilateral 

lower extremity edema over the past 3 weeks but has not had this before. 


Patient does not use oxygen at home.  Patient's wife says his pulse ox generally

runs around 95% on room air.





Review of Systems


Constitutional: denies: Fever


Cardiac: reports: Pedal edema.  denies: Chest pain / pressure, Palpitations


Respiratory: reports: Dyspnea


GI: denies: Abdominal Pain


Skin: reports: Other (cyanosis of hands and feet ; patient indicates this has 

been present for many years, no diagnosis/cause has been determined)


Neurologic: reports: Generalized weakness, Syncope.  denies: Focal weakness, 

Numbness, Headache





PD PAST MEDICAL HISTORY





- Past Medical History


Past Medical History: Yes


Cardiovascular: Hypertension


Respiratory: Other


Neuro: None


Endocrine/Autoimmune: None


GI: None


: Other





- Past Surgical History


Past Surgical History: Yes


Ortho: Other





- Present Medications


Home Medications: 


                                Ambulatory Orders











 Medication  Instructions  Recorded  Confirmed


 


Nintedanib Esylate [Ofev] 100 mg PO BID 09/05/22 01/14/23














- Allergies


Allergies/Adverse Reactions: 


                                    Allergies











Allergy/AdvReac Type Severity Reaction Status Date / Time


 


No Known Drug Allergies Allergy   Verified 01/14/23 05:08














- Social History


Does the pt smoke?: No


Smoking Status: Never smoker


Does the pt drink ETOH?: Yes


ETOH Use: Wine


Does the pt have substance abuse?: No





- Immunizations


Immunizations are current?: Yes





- POLST


Patient has POLST: No





PD ED PE NORMAL





- Vitals


Vital signs reviewed: Yes





- General


General: Alert and oriented X 3, No acute distress, Well developed/nourished





- HEENT


HEENT: PERRL, EOMI, Moist mucous membranes





- Cardiac


Cardiac: RRR





- Respiratory


Respiratory: No respiratory distress, Other (bilateral course breath sounds in 

all lung fields)





- Abdomen


Abdomen: Soft, Non tender





- Derm


Derm: Other (mild cyanosis in fingertips, toes)





- Neuro


Neuro: Alert and oriented X 3, CNs 2-12 intact, No motor deficit, No sensory 

deficit, Normal speech


Eye Opening: Spontaneous


Motor: Obeys Commands


Verbal: Oriented


GCS Score: 15





- Psych


Psych: Normal mood, Normal affect





PD ED PE EXPANDED





- Extremities


Extremities: Other (Bilateral pitting lower extremity edema from hips down to 

toes, 3+.)





Results





- Vitals


Vitals: 


                               Vital Signs - 24 hr











  01/14/23 01/14/23 01/14/23





  05:21 07:38 09:48


 


Heart Rate 97 92 86


 


Respiratory 28 H 25 H 23





Rate   


 


Blood Pressure 106/94 H 122/93 H 124/86 H


 


O2 Saturation 93 95 95


 


If not protocol   





: Oxygen Flow,   





liters/minute   














  01/14/23





  11:00


 


Heart Rate 88


 


Respiratory 22





Rate 


 


Blood Pressure 132/93 H


 


O2 Saturation 99


 


If not protocol 2





: Oxygen Flow, 





liters/minute 








                                     Oxygen











O2 Source                      Nasal cannula


 


Oxygen Flow Rate               2

















- EKG (time done)


  ** No standard instances


Rate: Rate (enter#) (97)


Rhythm: NSR


Axis: Anterior hemiblock (LAFB)


Intervals: Wide QRS (NSIVCD)


Ischemia: Non specific changes


Compare to prior EKG: Unchanged from prior EKG (no significant change compared 

to 9/5/22)





- Labs


Labs: 


                                Laboratory Tests











  01/14/23 01/14/23 01/14/23





  04:58 04:58 04:58


 


WBC  3.1 L  


 


RBC  2.85 L  


 


Hgb  11.0 L  


 


Hct  34.2 L  


 


MCV  120.0 H  


 


MCH  38.6 H  


 


MCHC  32.2  


 


RDW  16.3 H  


 


Plt Count  119 L  


 


MPV  10.0  


 


Neut # (Auto)  2.0  


 


Lymph # (Auto)  0.7 L  


 


Mono # (Auto)  0.3  


 


Eos # (Auto)  0.1  


 


Baso # (Auto)  0.0  


 


Absolute Nucleated RBC  0.00  


 


Nucleated RBC %  0.0  


 


Manual Slide Review  Indicated  


 


Platelet Estimate  DECREASED (<130,000)  


 


RBC Morph Micro Appear  1+ HYPOCHROMASIA  


 


Sodium   138 


 


Potassium   4.5 


 


Chloride   108 


 


Carbon Dioxide   22 


 


Anion Gap   8.0 


 


BUN   29 H 


 


Creatinine   1.2 


 


Estimated GFR (MDRD)   59 L 


 


Glucose   154 H 


 


Calcium   8.5 


 


Total Bilirubin   1.0 


 


AST   36 


 


ALT   30 


 


Alkaline Phosphatase   69 


 


Troponin I High Sens    70.8 H*


 


B-Natriuretic Peptide   


 


Total Protein   6.7 


 


Albumin   3.3 


 


Globulin   3.4 


 


Albumin/Globulin Ratio   1.0 


 


Lipase   54 H 














  01/14/23 01/14/23





  04:58 07:32


 


WBC  


 


RBC  


 


Hgb  


 


Hct  


 


MCV  


 


MCH  


 


MCHC  


 


RDW  


 


Plt Count  


 


MPV  


 


Neut # (Auto)  


 


Lymph # (Auto)  


 


Mono # (Auto)  


 


Eos # (Auto)  


 


Baso # (Auto)  


 


Absolute Nucleated RBC  


 


Nucleated RBC %  


 


Manual Slide Review  


 


Platelet Estimate  


 


RBC Morph Micro Appear  


 


Sodium  


 


Potassium  


 


Chloride  


 


Carbon Dioxide  


 


Anion Gap  


 


BUN  


 


Creatinine  


 


Estimated GFR (MDRD)  


 


Glucose  


 


Calcium  


 


Total Bilirubin  


 


AST  


 


ALT  


 


Alkaline Phosphatase  


 


Troponin I High Sens   73.7 H*


 


B-Natriuretic Peptide  1795 H 


 


Total Protein  


 


Albumin  


 


Globulin  


 


Albumin/Globulin Ratio  


 


Lipase  














PD Medical Decision Making





- ED course


Complexity details: reviewed old records, reviewed results, re-evaluated 

patient, considered differential, d/w patient, d/w family


ED course: 





Blood tests ordered and results reviewed by me: CBC, ER abdominal panel, 

troponin, BNP. On the CBC, mild pancytopenia is noted with white and red blood 

cell counts comparable to previous.  His previous platelet count(September 5, 2022) was normal; on tonight's CBC is mildly low at 119.


He has a troponin that is elevated to 70.8.  Mostly unremarkable results on ER 

abdominal panel; he does have a elevated BUN with a normal creatinine.  His BNP 

is significantly elevated, over 1700, with the only previous comparison from the

 visit this past September which was 280.


A repeat troponin is ordered and result pending at end of my shift. Care of 

patient turned over to Dr. Hudson





Departure





- Departure


Disposition: ED Place in Observation


Clinical Impression: 


Syncope


Qualifiers:


 Syncope type: unspecified Qualified Code(s): R55 - Syncope and collapse





CHF (congestive heart failure)


Qualifiers:


 Heart failure type: unspecified Heart failure chronicity: acute Qualified 

Code(s): I50.9 - Heart failure, unspecified





Discharge Date/Time: 01/14/23 14:30

## 2023-01-14 NOTE — PROVIDER PROGRESS NOTE
Hospitalist Cross-cover Note





- Cross-Cover Note


Cross-Cover Note: 





As a board-certified Cardiologist, credentialed to perform echoes, I did a 

bedside Echo evaluation.





Indication: Syncope and pulmonary fibrosis and shortness of breath.





The Echo showed:


Dilated left atrium mildly, severely dilated right atrium.


Upper limit of normal aortic root diameter with mural atherosclerosis


Normal left ventricular size and wall thickness, there is flattened and concave 

appearance of the interventricular septum, caused by compression from right 

ventricular overload.  Normal LV systolic function with some asynchrony from his

bundle branch block.  LVEF 55-60%.


Right ventricle is severely dilated (approximately 4-6 times the size of his 

left ventricle), normal RV wall thickness, severely depressed RV contractility


The aortic valve has mild sclerosis, mitral and tricuspid valves appear 

structurally normal.  Pulmonic valve poorly seen.


Doppler exam of the tricuspid valve only was done, and showed moderate tricuspid

regurgitation.  Pulmonary artery systolic pressure calculated at 100 mmHg- 

severe pulm HTN present.


No pericardial effusion seen





Final Impression:


Biatrial enlargement, right greater than left


Severely dilated right ventricle with severely depressed RV contractility. This 

is consistent with severe cor pulmonale.  Severe pulmonary hypertension present.


LV septal flattening is seen consistent with severe RV overload.  


LV systolic function is overall normal with EF 55-60%.

## 2023-01-14 NOTE — ED PHYSICIAN DOCUMENTATION
ED Addendum





- Addendum


Addendum: 





01/14/23 12:19


77-year-old male with a history of pulmonary fibrosis has had a syncopal episode

this morning of a prolonged nature.  He has had syncopal episodes previously 

usually associated with micturition.  He has had a Holter monitor study which 

demonstrated rare runs of both nonsustained V. tach and SVT.  At shift change 

his care is turned over to me by Dr. Zhang.  On my evaluation I found the 

patient had evidence of acute congestive heart failure.  He has had symptoms for

the past 3 weeks of increasing exertional dyspnea and lower extremity swelling 

and this is progressed to where his legs are now swollen up to his hips.  He is 

no longer able to lay flat in bed.  He is short of breath walking across the 

room.  I evaluated the patient's inferior vena cava and found it to be 2.62 cm 

and plethoric consistent with acute failure.  The patient was administered 

intravenous lasix with good results. I reviewed the patient's chest x-ray and 

found he had a considerable change from a CT  image done in September.  He 

has significant pulmonary edema in the lower lobes of his lungs.  The patient 

has not been diagnosed with congestive heart failure previously, he has had a 

prolonged syncopal episode today and has prior evidence of arrhythmia.  I 

consider these 3 components concerning for a potential worse outcome by 

attempting to treat this as an outpatient I have asked her hospitalist to put 

the patient in the hospital for continued diuresis and observation.

## 2023-01-14 NOTE — XRAY REPORT
PROCEDURE:  Chest 1 View X-Ray

 

INDICATIONS:  chest pain

 

TECHNIQUE:  One view of the chest was acquired.  

 

COMPARISON:  Correlation is made with prior chest CT, 9/5/2022.

 

FINDINGS:  

 

Surgical changes and devices:  Right shoulder arthroplasty is partially seen.

 

Lungs and pleura:  The lung volumes can be seen. Generalized interstitial prominence can be seen. The
re is blunting of the costophrenic angles. No pneumothorax is seen.

 

Mediastinum:  Mediastinal contours appear normal.  Heart size is moderately enlarged.  

 

Bones and chest wall:  No suspicious bony lesions.  Age-appropriate degenerative changes are seen. Mi
ld dextroconvex scoliotic curvature is seen.   Overlying soft tissues appear unremarkable.  

 

 

IMPRESSION:  

 

Cardiomegaly with interstitial prominence and apparent small pleural effusions. CHF is suspected.

 

Low lung volumes.

 

Postoperative and degenerative changes are seen.  

 

Reviewed by: Madhav Westfall MD on 1/14/2023 8:19 AM [facility]

Approved by: Madhav Westfall MD on 1/14/2023 8:19 AM [facility]

 

 

Station ID:  [id number]

## 2023-01-14 NOTE — HISTORY & PHYSICAL EXAMINATION
History of Present Illness





- Admitted From


Admitted From:: ED





- History Obtained From


History obtained from: ED provider and patient and his wife in room.





- History of Present Illness


HPI Comment/Other: 


This is a 77-year-old white male with a history of pulmonary fibrosis Dx 76 mos 

ago and on Ofev and followed by Dr Pruitt (Pulmonologist in [place]), BPH 

previously on Afluzosin, history of HTN previously on lisinopril. He had a 

stress test 6 mos ago, which was felt to be normal; it was done for clearance 

for shoulder replacement which he underwent successfully 6 months.  He developed

leg edema and worsening shortness of breath over the past 3 weeks. He does not 

have a history of CHF and these symptoms have not been worked up. He has had 

prior recurrent syncope and near syncope, some felt to be micturition syncope, 

and he has only had a work-up including a 14-day heart monitor done 3 mos ago. 

The heart monitor showed episodes of SVT and he was put on metoprolol but never 

started it.  





History from wife:


Patient's wife says that she was in bed next to the patient last night, when she

heard the sound of his cane being knocked over; she looked over to find the 

patient was on the edge of the bed hunched over and barely able to support 

himself.  She thus came around to the side of the bed to help support patient, 

noting that he was barely responding verbally and that he felt like he was "dead

weight".  She says that very rapidly he became unresponsive as she maneuvered 

him to gradually get his head back on the pillow, but legs were still dangling. 

She then called 911 and while waiting for EMS was instructed by 911 to start 

CPR, which the patient's wife tried to do with 1 hand while speaking to 911, 

holding the phone in the other hand. EMS arrived to find patient was awakening 

and blood pressure was in the 100 syst range, he was in regular rhythm on 

cardiac monitor, heart rate 60s-70s. FSBS by EMS was 121. 


The patient does recall having the urge to urinate and sitting up on the edge of

the bed.  But the next thing he remembers, is the EMTs standing over him. 





Patient and his wife say that he has had many episodes of syncope and near 

syncope in the past and thus knows that when he goes to stand up from lying 

down, he needs to first sit up on the edge of the bed and wait for a while to 

make sure he is not feeling lightheaded, otherwise he "sees stars".  He then 

normally would transition over to his bedside commode. Because of these events, 

his PCP has taken him off the Lisinopril and the BPH med.





Outside records reviewed: 14-day Holter monitor done September 2022 which showed

sinus rhythm, rate , rare PVC and non-sustained VT, 18 runs of SVT at 

rates of 146 and the longest was 16 seconds





In the ED the patient had a blood pressure of 122/93, heart rate in the 80s in 

NSR, O2 saturation 95% on room air.  A chest x-ray showed CHF. BNP elevated at 

1790 and there has never been a prior BNP done here.  He had 2 troponins done 

that are elevated at 70 and 73 but are flat. The ED provider did a bedside IVC 

eval. saw plethora and he received a dose of 40 mg of Lasix. The ED doctor 

reached out to the Hospitalist team to have this patient placed in Observation 

for further evaluation and management of syncope and new onset of CHF.





I discussed his CODE STATUS and the patient wants to be a DNR/DNI.








History





- Past Medical History


Cardiovascular: reports: Hypertension (Remote HTN history, no longer 

hypertensive and no longer on)


Respiratory: reports: Other (Pulm fibrosis dx 6/2022. PFTs were done, he does 

not know the results but there was no improvement after bronchodilator)


Neuro: reports: None


Endocrine/Autoimmune: reports: None


GI: reports: GERD


: reports: Benign prostate hypertrophy


MRSA Hx?: No


Other Past Medical History: Chronic anemia, followed by Heme-Onc





- Past Surgical History


Ortho: reports: Other (L shoulder replacement)





- Family & Social History


Family History Comment/Other: No diseases run in the family.  He has no natural 

children.


Living arrangement: At home


Living Situation: With spouse/s.o.


Social History Notes: He is a non-smoker who never smoked.  He is retired from 

being a .  He has 2 glasses of wine every night after dinner, has never

had alcohol withdrawal.





- Substance History


Use: Uses substance without health or social issues: NONE





- POLST


Patient has POLST: No





Meds/Allgy





- Home Medications


Home Medications: 


                                Ambulatory Orders











 Medication  Instructions  Recorded  Confirmed


 


Nintedanib Esylate [Ofev] 100 mg PO BID 09/05/22 01/14/23














- Allergies


Allergies/Adverse Reactions: 


                                    Allergies











Allergy/AdvReac Type Severity Reaction Status Date / Time


 


No Known Drug Allergies Allergy   Verified 01/14/23 05:08














Review of Systems





- Constitutional


Constitutional: reports: Weight loss (He has lost approximately 100 pounds over 

the past 1 year without trying.)





- Ears, Nose & Throat


Ears, Nose & Throat: reports: Hearing loss





- Cardiovascular


Cariovascular: reports: Edema, Lightheadedness, Syncope, Exertional dyspnea, 

Decr. exercise tolerance, Orthopnea





- Respiratory


Respiratory: reports: Cough, Orthopnea, SOB at rest, SOB with exertion





- Genitourinary


Genitourinary: reports: Frequency





- All Other Systems


All Other Systems: reports: Reviewed and negative





Exam





- Vital Signs


Vital Signs: 





                                Vital Signs x48h











  Temp Pulse Resp BP Pulse Ox


 


 01/14/23 09:48   86  23  124/86 H  95


 


 01/14/23 07:38   92  25 H  122/93 H  95


 


 01/14/23 05:21   97  28 H  106/94 H  93


 


 01/14/23 05:10  36.9 C  105 H  27 H  116/90 H  94














- Physical Exam


General Appearance: positive: Mild distress (He is short of breath with 

speaking.  He is chronically clearing his throat but has no cough or sputum), 

Other (Tall, thin WM)


Eyes Bilateral: positive: EOMI


Neck: positive: Nml inspection, No JVD (in vertical position)


Respiratory: positive: Breath sounds nml, Other (increased AP diameter)


Cardiovascular: positive: Regular rate & rhythm, Systolic murmur, Other (PMI v

ertically displaced)


Abdomen: positive: Other (scaphoid)


Skin: positive: Warm, Dry


Extremities: positive: Non-tender, Other (2+edema to mid shins)


Neurologic/Psychiatric: positive: Oriented x3, Motor nml, Sensation nml





Conclusion/Plan





- Problem List


(1) Syncope


Conclusion/Plan: 


The patient has had recurrent syncope.  We are told he has a history of 

micturition syncope and also that he needs to equilibrate his blood pressure 

before going from sitting to standing.  This suggest orthostasis. His blood 

pressure med Lisinopril and other vasodilator for prostate treatment have been 

stopped because of low blood pressure.


His Echo done today shows that he has severe cor pulmonale, which is likely 

caused by his pulmonary fibrosis. His abn EKG is unchanged from 9/22. With cor 

pulmonale, his BP will be very sensitive to fluid status. 


Plan:


Bedrest, out of bed only with assistance, bedside commode will be ordered


Placed on telemetry to watch for arrhythmias


Monitor orthostatic vital signs every shift.


Obtain a complete Echocardiogram.


Qualifiers: 


   Syncope type: unspecified   Qualified Code(s): R55 - Syncope and collapse   





(2) Orthostatic hypotension


Conclusion/Plan: 


The first set of orthostatic vital signs done, once he left the ER, shows that 

he is very orthostatic, his heart rate climbs to 111 with standing.


With his cor pulmonale his blood pressure will be very sensitive to volume 

status, he should not be over diuresed or become dehydrated


Plan:


Will not continue with Lasix.


Will give a saline bolus of 500 cc


Will start midodrine 2.5 three times daily with meals


Continue to monitor orthostatic vital signs q shift


I educated the wife and the patient that if he has syncope again, the faster the

 legs can be elevated to the same level as his head, the sooner he will regain 

consciousness or it may prevent full syncope.








(3) CHF (congestive heart failure)


Conclusion/Plan: 


Patient reports 3 weeks of VALLES and leg edema which may have developed from his 

pulmonary fibrosis causing right heart failure.  His bedside eval done in ED 

shows a very plethoric IVC, consistent with RV overload. 


But his chest x-ray was read as having pulmonary edema and BNP is very elevated,

 so he may have new LV failure. We have no prior BNP here available for 

comparison.


The cause of new onset of heart failure has been started to be evaluated by ED: 

He has had 2 troponins which are negative for acute MI. His hemoglobin is 11, 

not severely anemic.


Plan:


Will obtain a Echocardiogram (but today is Sat and we have no Echo Tech here 

until Tues, so a bedside Echo was done by myself)


Will place on telemetry and watch for treatable arrhythmias


I suspect that the findings of "CHF" on chest x-ray are his pulmonary fibrosis 

and not pulmonary edema therefore will not continue diuretic


Follow BMP and magnesium after he received 1 dose of Lasix IV by the ED provider


Watch I's and O's and daily weights


Qualifiers: 


   Heart failure type: unspecified   Heart failure chronicity: acute   Qualified

 Code(s): I50.9 - Heart failure, unspecified   





(4) Pulmonary fibrosis


Conclusion/Plan: 


As per history.


Plan:


We will continue him on his Ofev, since Ofev does not have hemodynamic side 

effects.


O2 saturations may be at 88% or greater


He needs more aggressive management by pulmonology and cardiology for his severe

 pulmonary hypertension








(5) Abnormal EKG


Conclusion/Plan: 


His EKG is extremely abnormal and shows sinus rhythm, right bundle branch block,

 and an extreme right axis deviation.


These findings suggest severe cor pulmonale which may be present from his 

pulmonary fibrosis


Plan:


Obtain Echo (Since we did since this is Saturday and we do not have an echo 

technician here until Tuesday, I performed a bedside echo with Doppler myself, 

see under separate note).


Place on telemetry, watch for arrhythmias








(6) Pulmonary hypertension with chronic cor pulmonale


Conclusion/Plan: 


I personally performed a bedside Echo with Doppler on this patient.  The Echo 

shows that he has severe cor pulmonale (his RV is 5 times the size of his LV and

 has poor RV function).  He also has pulm hypertension with PA pressure 100 

mmHg. The prognosis with this finding is very poor, less than 1 year survival ra

te.


SVT was seen on his Holter, this is very common with pulmonary pathologies.


Plan:


This patient needs more aggressive pulmonary and cardiology management as an 

outpatient


We will continue his Ofev while here.


Will not give aggressive diuresis since his LV cardiac output, and therefore his

 BP, is very volume sensitive. In fact he says that he has decreased how much 

fluid he drinks, in order to not get up as often to urinate at night.  This 

likely led to his LV preload reduction and orthostasis.


Will order a complete Echo with Doppler and with "bubble" study to recalculate 

his PA pressure, RV chamber size, and to evaluate for an ASD or PFO. If he has 

right-to-left shunting causing systemic hypoxia, he has a extremely poor 

prognosis.








- Lab Results


Fish Bones: 


                                 01/15/23 05:10





                                 01/15/23 05:10





- Diagnostic Imaging Results


Diagnostic Imaging Results: positive: Final report reviewed

## 2023-01-14 NOTE — PHARMACY PROGRESS NOTE
- Best Possible Medication History


Admit Date and Time: 01/14/23 125


Processed by: Pharmacy


Medication History completed: Yes


Patient Interview: Completed


Secondary Source(s): Pharmacy records





As the person ultimately responsible for medication therapy, providers are able 

to order a medication from an existing home medication list in [facility] via the 

"Reconcile Routine" prior to Confirmation of that medication by support staff. 

Such practice is discouraged except when the physician, in their clinical 

judgment, deems that a medical need exists for a medication without regard to 

previous use.

## 2023-01-15 LAB
ANION GAP SERPL CALCULATED.4IONS-SCNC: 9 MMOL/L (ref 6–13)
BASOPHILS NFR BLD AUTO: 0 10^3/UL (ref 0–0.1)
BASOPHILS NFR BLD AUTO: 0.9 %
BUN SERPL-MCNC: 33 MG/DL (ref 6–20)
CALCIUM UR-MCNC: 8.7 MG/DL (ref 8.5–10.3)
CHLORIDE SERPL-SCNC: 108 MMOL/L (ref 101–111)
CO2 SERPL-SCNC: 28 MMOL/L (ref 21–32)
CREAT SERPLBLD-SCNC: 1.4 MG/DL (ref 0.6–1.2)
EOSINOPHIL # BLD AUTO: 0.1 10^3/UL (ref 0–0.7)
EOSINOPHIL NFR BLD AUTO: 3.5 %
ERYTHROCYTE [DISTWIDTH] IN BLOOD BY AUTOMATED COUNT: 16.6 % (ref 12–15)
GFRSERPLBLD MDRD-ARVRAT: 49 ML/MIN/{1.73_M2} (ref 89–?)
GLUCOSE SERPL-MCNC: 102 MG/DL (ref 70–100)
HCT VFR BLD AUTO: 32.1 % (ref 42–52)
HGB UR QL STRIP: 10.1 G/DL (ref 14–18)
LYMPHOCYTES # SPEC AUTO: 0.6 10^3/UL (ref 1.5–3.5)
LYMPHOCYTES NFR BLD AUTO: 18.3 %
MAGNESIUM SERPL-MCNC: 1.9 MG/DL (ref 1.7–2.8)
MCH RBC QN AUTO: 38 PG (ref 27–31)
MCHC RBC AUTO-ENTMCNC: 31.5 G/DL (ref 32–36)
MCV RBC AUTO: 120.7 FL (ref 80–94)
MONOCYTES # BLD AUTO: 0.5 10^3/UL (ref 0–1)
MONOCYTES NFR BLD AUTO: 15.4 %
NEUTROPHILS # BLD AUTO: 2.1 10^3/UL (ref 1.5–6.6)
NEUTROPHILS # SNV AUTO: 3.4 X10^3/UL (ref 4.8–10.8)
NEUTROPHILS NFR BLD AUTO: 61.9 %
NRBC # BLD AUTO: 0 /100WBC
NRBC # BLD AUTO: 0 X10^3/UL
PDW BLD AUTO: 10.8 FL (ref 7.4–11.4)
PLATELET # BLD: 119 10^3/UL (ref 130–450)
PLATELET BLD QL SMEAR: (no result)
POTASSIUM SERPL-SCNC: 4.4 MMOL/L (ref 3.5–5)
RBC MAR: 2.66 10^6/UL (ref 4.7–6.1)
RBC MORPH BLD: (no result)
SODIUM SERPLBLD-SCNC: 145 MMOL/L (ref 135–145)

## 2023-01-15 RX ADMIN — SODIUM CHLORIDE, PRESERVATIVE FREE SCH ML: 5 INJECTION INTRAVENOUS at 00:48

## 2023-01-15 RX ADMIN — DILTIAZEM HYDROCHLORIDE SCH EACH: 120 CAPSULE, COATED, EXTENDED RELEASE ORAL at 07:42

## 2023-01-15 RX ADMIN — SODIUM CHLORIDE, PRESERVATIVE FREE SCH ML: 5 INJECTION INTRAVENOUS at 17:14

## 2023-01-15 RX ADMIN — PANTOPRAZOLE SODIUM SCH MG: 40 TABLET, DELAYED RELEASE ORAL at 16:42

## 2023-01-15 RX ADMIN — MIDODRINE HYDROCHLORIDE SCH MG: 2.5 TABLET ORAL at 12:04

## 2023-01-15 RX ADMIN — PANTOPRAZOLE SODIUM SCH MG: 40 TABLET, DELAYED RELEASE ORAL at 07:41

## 2023-01-15 RX ADMIN — Medication SCH MG: at 12:04

## 2023-01-15 RX ADMIN — MIDODRINE HYDROCHLORIDE SCH MG: 2.5 TABLET ORAL at 17:06

## 2023-01-15 RX ADMIN — SODIUM CHLORIDE, PRESERVATIVE FREE SCH ML: 5 INJECTION INTRAVENOUS at 11:33

## 2023-01-15 RX ADMIN — MIDODRINE HYDROCHLORIDE SCH MG: 2.5 TABLET ORAL at 07:41

## 2023-01-15 RX ADMIN — DILTIAZEM HYDROCHLORIDE SCH EACH: 120 CAPSULE, COATED, EXTENDED RELEASE ORAL at 17:14

## 2023-01-15 NOTE — PROVIDER PROGRESS NOTE
Assessment/Plan





- Problem List


(1) Syncope


Qualifiers: 


   Syncope type: unspecified   Qualified Code(s): R55 - Syncope and collapse   


Assessment/Plan: 


The patient has had recurrent syncope.  We are told he has a history of 

micturition syncope and also that he needs to equilibrate his blood pressure 

before going from sitting to standing.  This suggest orthostasis. His blood 

pressure med Lisinopril and other vasodilator Afluzosin. for prostate treatment,

have been stopped because of low blood pressure.


His bedside STAT Echo that I did 1/14/23, showed that he has severe cor 

pulmonale, which is likely caused by his pulmonary fibrosis. His abn EKG is u

nchanged from 9/22. With cor pulmonale, his BP will be very sensitive to fluid 

status. 


Severe orthostatic hypotension has now been documented since he is admitted: 

Standing blood pressure today is 69 mmHg


Plan:


We will admit this Observation patient to Inpatient status


Bedrest, out of bed only with assistance, bedside commode will be ordered


Continue telemetry to watch for arrhythmias


Monitor orthostatic vital signs every shift.


Qualifiers: 


   Syncope type: unspecified   Qualified Code(s): R55 - Syncope and collapse   





(2) Orthostatic hypotension


Conclusion/Plan: 


He is severely orthostatic: with standing his systolic blood pressure was 69 

mmHg


With his cor pulmonale, his blood pressure will be very sensitive to volume 

status; he should not be over diuresed or become dehydrated, since that will dec

rease LV preload and decrease cardiac output and lead to hypotension.


Plan:


We will admit this Observation patient to Inpatient status


Will not continue with Lasix.


Will continue Midodrine 2.5 three times daily with meals and adjust dose as 

needed to prevent orthostasis


Continue to monitor orthostatic vital signs q shift


We will add SULEMAN stockings order to his SCDs. Wife said he was putting on SULEMAN 

stockings daily until his ankles started to swell and he could not put them on 

recently.


I again today educated the wife and the patient, that if he has syncope again, 

the faster the legs can be elevated to the same level as his head, the sooner he

will regain consciousness or it may prevent full syncope. When the current 

syncopal episode happened, the wife put his head and torso on the bed but let 

his feet dangle off the side until EMS arrived.





(3) Pancytopenia


Impression:


The wife told me at admission that the patient has a Hematologist who keeps an 

eye on his pancytopenia. 


His low platelet count and macrocytic anemia could be from alcohol abuse (see

#5).


Plan:


Follow CBC daily


Would transfuse if hemoglobin goes under 7, or if < 8 and symptomatic





(4) Macrocytic anemia


Impression:


His MCV is 120.


Plan:


Will check B12 and folate levels and replace if low.





(5) Alcohol abuse


Impression:


Patient told me he drinks 2 glasses of wine per day.  He told the  RN Kelly,

who did his intake, that he drinks 5 glasses of wine per day.


This is likely why he has a very high MCV and low plts


He therefore may have thiamine deficiency causing autonomic dysfunction


Plan:


Start CIWA protocol to watch for withdrawal since he has now been 48 hours since

his last drink


Will check his PT/INR regarding liver synthesis


Will add daily Thiamine orally


I discussed decreasing alcohol intake with the patient today, wife was at 

bedside





(6) Pulmonary fibrosis


Conclusion/Plan: 


This was Dx 6 mark ago. The wife reports he had PFTs and that there was "no 

benefit after getting the bronchodilator", so no inhalers were ever prescribed. 

He is followed by Dr. Pruitt, Pulmonologist at [place]


Plan:


We will continue him on his Ofev, since Ofev does not have hemodynamic side 

effects.


O2 saturations may be at 88% or greater


He needs more aggressive management by pulmonology and cardiology for his severe

pulmonary hypertension





(7) Pulmonary hypertension with chronic cor pulmonale


Conclusion/Plan: 


I personally performed a bedside Echo with Doppler on this patient, on 1/14/23. 

The Echo showed that he has severe cor pulmonale (his RV is 5 times the size of 

his LV and has poor RV function).  He also has severe pulm hypertension with PA 

pressure 100 mmHg. The prognosis with this finding is very poor, less than 1 

year survival rate.


SVT was seen on the Holter done in 9/2022. Atrial tachy-dysrhythmias are very 

common with pulmonary pathologies. He was prescribed to take metoprolol but 

never started it. He has a Cardiologist in [place], but cannot remember his 

name.


Today I explained in detail what cor pulmonale and pulmonary hypertension are, 

osman out the explanation for pt and wife to take home, and answered their 

questions to their satisfaction


Plan:


We will continue his Ofev while here.


Will get the name of his Cardiologist from his wife and contact that 

Cardiologist, I would like to see the last Echo report for comparison, perhaps 

these findings are new since 6 mos ago.


Will not give aggressive diuresis since his LV cardiac output, and therefore his

BP, is very volume sensitive. In fact he reported that he has recently decreased

how much fluid he drinks, in order to not get up as often to urinate at night.  

This likely added to his LV preload reduction and orthostasis.


Will order a complete Echo with Doppler and with "bubble" study to recalculate 

his PA pressure, RV chamber size, and to evaluate for an ASD or PFO. If he has 

right-to-left shunting causing systemic hypoxia, he has a extremely poor 

prognosis.


Today I discussed potentially poor prognosis of cor pulmonale with the patient, 

and wife at bedside


He needs more aggressive management by pulmonology and cardiology for his cor 

pulmonale and severe pulmonary hypertension





(8) Abnormal EKG


Conclusion/Plan: 


His EKG is extremely abnormal and shows RBBB and an extreme right axis devia

tion. These findings suggest severe cor pulmonale. He had a similar EKG in 

9/2022. Therefore, his Cardiologist may have seen that when he did his pre-op 

stress testing and Echo 6 mos ago to clear him for shoulder replacement surgery.

But his severe cor pulmonale and pulm hypertension would have made him a high 

risk anesthesia candidate however.


Plan:


Will get the name of his Cardiologist from his wife and I will contact the 

Cardiologist


Will obtain an Echo with Doppler and "bubble" study (but today is Sunday and we 

do not have an Echo technician here until Tuesday).


Continue on telemetry, watching for arrhythmias





(9) BPH


Complains of incomplete emptying.  He used to be on a low Zosyn but it was 

stopped since it added to hypotension


Plan:


Currently no BPH meds can be added





(10) GERD


He told his RN he was on some medication recently started for indigestion.  

There is nothing listed from his reconciled medication list


Plan:


Will start Protonix











- Current Meds


Current Meds: 





                               Current Medications











Generic Name Dose Route Start Last Admin





  Trade Name Freq  PRN Reason Stop Dose Admin


 


Midodrine  2.5 mg  01/14/23 17:00  01/15/23 07:41





  Midodrine 2.5 Mg Tablet  PO   2.5 mg





  TIDWM [facility]   Administration


 


Pantoprazole Sodium  40 mg  01/15/23 07:30  01/15/23 07:41





  Pantoprazole 40 Mg Tablet  PO   40 mg





  0730,1630 N[facility]   Administration


 


Pt. Own Med: Ofev (  1 each  01/14/23 17:00  01/15/23 07:42





  Nintedanib) 100mg  PO   1 each





  0800,1700 NOVA   Administration


 


Sodium Chloride  10 ml  01/14/23 17:00  01/15/23 00:48





  Sodium Chloride Flush 0.9% 10 Ml Syringe  IVP   10 ml





  0100,0900,1700 NOVA   Administration














- Lab Result


Fish Bone Diagrams: 


                                 01/15/23 05:10





                                 01/15/23 05:10





- Additional Planning


My Orders: 





My Active Orders





01/14/23 12:53


Activity Orders [RC] Q2HR 


IO [RC] IOSHIFT 


Initiate Bowel Care Protocol [RC] .protocol 


Initiate Personal Care Protoco [RC] .protocol 


Oxygen Therapy [RC] .PRN 


Telemetry (24 Hour) [RC] Q4HR 


Vital Signs [RC] Q4HR 


Acetaminophen [Tylenol]   650 mg PO Q4HR PRN 


Ondansetron Inj [Zofran Inj]   4 mg IVP Q6HR PRN 


Sodium Chloride Flush 0.9% [Normal Saline Flush 0.9%]   10 ml IVP PRN PRN 


Code Status [OTHERS] Routine 


Condition of Patient [OTHERS] Routine 


DVT Prophylaxis [OTHERS] Routine 





01/14/23 12:56


Daily Weight [RC] 0600 


IV Insert [RC] .ONCE 


Initiate Line Care Protocol [RC] QSHIFT 





01/14/23 13:02


Orthostatic [Vital Signs - Orthostatic] [RC] QSHIFT 





01/14/23 Dinner


Regular Diet [DIET] 





01/14/23 17:00


Midodrine [ProAmatine]   2.5 mg PO TIDWM 


Patient Own Med [Patient Own Medication]   1 each PO 0800,1700 


Sodium Chloride Flush 0.9% [Normal Saline Flush 0.9%]   10 ml IVP 0100,0900,1700







01/15/23 07:30


Pantoprazole [Protonix]   40 mg PO 0730,1630 





01/15/23 10:29


Admit \ Transfer \ Status [RC] .ONCE 





01/15/23 10:31


CIWA - AR Score Card [RC] Q4HR 


LORazepam INJ [Ativan Inj (Vial)]   1 mg IVP Q30M PRN 





01/15/23 10:32


SULEMAN Hose and SCDs [RC] QSHIFT 


Telemetry- [RC] Q4HR 





01/15/23 10:33


Miscellaenous Nursing Order [RC] QSHIFT 





01/16/23 05:00


BMP - BASIC METABOLIC PANEL [CHEM] DAILYLAB 


CBC - COMP BLD CT W/AUTO DIFF [HEME] DAILYLAB 


FOLATE [IAI] DAILYLAB 


IRON TIBC PANEL [CHEM] DAILYLAB 


MAGNESIUM [CHEM] DAILYLAB 


PT WITH INR [COAG] DAILYLAB 


VITAMIN B12 [IAI] DAILYLAB 





01/17/23 07:00


Echo Complete w/Bubble Study [ECHO] Routine 














Subjective





- Subjective


Patient Reports: Resting Comfortably (Orders say bedrest so he has not been OOB 

except for a BM at bedside commode today.  Therefore no dizziness.)





Objective


Vital Signs: 





                               Vital Signs - 24 hr











  01/14/23 01/14/23 01/14/23





  11:00 13:00 14:21


 


Temperature   


 


Heart Rate 88 87 


 


Heart Rate [   





Brachial]   


 


Respiratory 22 16 





Rate   


 


Blood Pressure 132/93 H 121/91 H 


 


Blood Pressure   





[Right Brachial   





artery]   


 


O2 Saturation 99 84 L 


 


If not protocol 2 2 2





: Oxygen Flow,   





liters/minute   














  01/14/23 01/14/23 01/14/23





  17:00 18:03 19:45


 


Temperature 36.7 C  


 


Heart Rate   


 


Heart Rate [ 100  





Brachial]   


 


Respiratory 21  





Rate   


 


Blood Pressure   


 


Blood Pressure 102/71  





[Right Brachial   





artery]   


 


O2 Saturation 93  


 


If not protocol 2 2 2





: Oxygen Flow,   





liters/minute   














  01/14/23 01/15/23 01/15/23





  20:01 01:15 05:25


 


Temperature 36.8 C 36.5 C 36.6 C


 


Heart Rate   


 


Heart Rate [ 91 87 84





Brachial]   


 


Respiratory 21 18 18





Rate   


 


Blood Pressure   


 


Blood Pressure 96/70 98/75 108/80





[Right Brachial   





artery]   


 


O2 Saturation 95 97 97


 


If not protocol 2 1 1





: Oxygen Flow,   





liters/minute   














  01/15/23





  07:48


 


Temperature 36.3 C L


 


Heart Rate 


 


Heart Rate [ 57 L





Brachial] 


 


Respiratory 20





Rate 


 


Blood Pressure 


 


Blood Pressure 110/83 H





[Right Brachial 





artery] 


 


O2 Saturation 92


 


If not protocol 1





: Oxygen Flow, 





liters/minute 








                                     Oxygen











O2 Source                      Nasal cannula


 


Oxygen Flow Rate               2














I&O (Last 24 Hrs): 





                          Intake and Output Totals x24h











 01/13/23 01/14/23 01/15/23





 23:59 23:59 23:59


 


Intake Total  800 660


 


Output Total  1525 375


 


Balance  -725 285











General: Alert, Oriented x3


HEENT: Mucous membr. moist/pink


Neck: Supple, No JVD


Neuro: Alert, Non Focal


Cardiovascular: Regular rate, Other (syst murmur)


Respiratory: No respiratory distress, Breath sounds nml


Abdomen: Normal bowel sounds, Soft


Extremities: No clubbing, Other (1+ edema to mid shins)





- Results


Results: 





                               Laboratory Results











WBC  3.4 x10^3/uL (4.8-10.8)  L  01/15/23  05:10    


 


RBC  2.66 10^6/uL (4.70-6.10)  L  01/15/23  05:10    


 


Hgb  10.1 g/dL (14.0-18.0)  L  01/15/23  05:10    


 


Hct  32.1 % (42.0-52.0)  L  01/15/23  05:10    


 


MCV  120.7 fL (80.0-94.0)  H  01/15/23  05:10    


 


MCH  38.0 pg (27.0-31.0)  H  01/15/23  05:10    


 


MCHC  31.5 g/dL (32.0-36.0)  L  01/15/23  05:10    


 


RDW  16.6 % (12.0-15.0)  H  01/15/23  05:10    


 


Plt Count  119 10^3/uL (130-450)  L  01/15/23  05:10    


 


MPV  10.8 fL (7.4-11.4)   01/15/23  05:10    


 


Neut # (Auto)  2.1 10^3/uL (1.5-6.6)   01/15/23  05:10    


 


Lymph # (Auto)  0.6 10^3/uL (1.5-3.5)  L  01/15/23  05:10    


 


Mono # (Auto)  0.5 10^3/uL (0.0-1.0)   01/15/23  05:10    


 


Eos # (Auto)  0.1 10^3/uL (0.0-0.7)   01/15/23  05:10    


 


Baso # (Auto)  0.0 10^3/uL (0.0-0.1)   01/15/23  05:10    


 


Absolute Nucleated RBC  0.00 x10^3/uL  01/15/23  05:10    


 


Nucleated RBC %  0.0 /100WBC  01/15/23  05:10    


 


Manual Slide Review  Indicated   01/15/23  05:10    


 


Platelet Estimate  DECREASED (<130,000)  (NORMAL)   01/15/23  05:10    


 


RBC Morph Micro Appear  2+ MACROCYTOSIS  (NORMAL) 1+ HYPOCHROMASIA  (NORMAL)   

01/15/23  05:10    


 


RBC Morph Micro Appear  2+ MACROCYTOSIS  (NORMAL) 1+ HYPOCHROMASIA  (NORMAL)   

01/15/23  05:10    


 


Sodium  145 mmol/L (135-145)   01/15/23  05:10    


 


Potassium  4.4 mmol/L (3.5-5.0)   01/15/23  05:10    


 


Chloride  108 mmol/L (101-111)   01/15/23  05:10    


 


Carbon Dioxide  28 mmol/L (21-32)   01/15/23  05:10    


 


Anion Gap  9.0  (6-13)   01/15/23  05:10    


 


BUN  33 mg/dL (6-20)  H  01/15/23  05:10    


 


Creatinine  1.4 mg/dL (0.6-1.2)  H  01/15/23  05:10    


 


Estimated GFR (MDRD)  49  (>89)  L  01/15/23  05:10    


 


Glucose  102 mg/dL ()  H  01/15/23  05:10    


 


Calcium  8.7 mg/dL (8.5-10.3)   01/15/23  05:10    


 


Magnesium  1.9 mg/dL (1.7-2.8)   01/15/23  05:10    


 


Total Bilirubin  1.0 mg/dL (0.2-1.0)   01/14/23  04:58    


 


AST  36 IU/L (10-42)   01/14/23  04:58    


 


ALT  30 IU/L (10-60)   01/14/23  04:58    


 


Alkaline Phosphatase  69 IU/L ()   01/14/23  04:58    


 


Troponin I High Sens  73.7 ng/L (2.3-19.7)  H*  01/14/23  07:32    


 


B-Natriuretic Peptide  1811 pg/mL (5-100)  H  01/15/23  05:10    


 


Total Protein  6.7 g/dL (6.7-8.2)   01/14/23  04:58    


 


Albumin  3.3 g/dL (3.2-5.5)   01/14/23  04:58    


 


Globulin  3.4 g/dL (2.1-4.2)   01/14/23  04:58    


 


Albumin/Globulin Ratio  1.0  (1.0-2.2)   01/14/23  04:58    


 


Lipase  54 U/L (22-51)  H  01/14/23  04:58    


 


Urine Color  YELLOW   01/14/23  16:15    


 


Urine Clarity  CLEAR  (CLEAR)   01/14/23  16:15    


 


Urine pH  5.5 PH (5.0-7.5)   01/14/23  16:15    


 


Ur Specific Gravity  1.010  (1.002-1.030)   01/14/23  16:15    


 


Urine Protein  NEGATIVE mg/dL (NEGATIVE)   01/14/23  16:15    


 


Urine Glucose (UA)  NEGATIVE mg/dL (NEGATIVE)   01/14/23  16:15    


 


Urine Ketones  NEGATIVE mg/dL (NEGATIVE)   01/14/23  16:15    


 


Urine Occult Blood  NEGATIVE  (NEGATIVE)   01/14/23  16:15    


 


Urine Nitrite  NEGATIVE  (NEGATIVE)   01/14/23  16:15    


 


Urine Bilirubin  NEGATIVE  (NEGATIVE)   01/14/23  16:15    


 


Urine Urobilinogen  0.2 (NORMAL) E.U./dL (NORMAL)   01/14/23  16:15    


 


Ur Leukocyte Esterase  NEGATIVE  (NEGATIVE)   01/14/23  16:15    


 


Urine RBC  None Seen /HPF (0-5)   01/14/23  16:15    


 


Urine WBC  0-3 /HPF (0-3)   01/14/23  16:15    


 


Ur Squamous Epith Cells  NONE SEEN  (<= Few)   01/14/23  16:15    


 


Urine Bacteria  None Seen /HPF (None Seen)   01/14/23  16:15    


 


Urine Casts  3-5 Hyaline Casts /LPF  01/14/23  16:15    


 


Urine Culture Comments  NOT INDICATED   01/14/23  16:15    


 


SARS-CoV-2 (PCR)  NOT DETECTED   01/14/23  14:22

## 2023-01-16 VITALS — DIASTOLIC BLOOD PRESSURE: 82 MMHG | SYSTOLIC BLOOD PRESSURE: 109 MMHG

## 2023-01-16 LAB
ANION GAP SERPL CALCULATED.4IONS-SCNC: 8 MMOL/L (ref 6–13)
BASOPHILS NFR BLD AUTO: 0 10^3/UL (ref 0–0.1)
BASOPHILS NFR BLD AUTO: 0.9 %
BUN SERPL-MCNC: 34 MG/DL (ref 6–20)
CALCIUM UR-MCNC: 8.8 MG/DL (ref 8.5–10.3)
CHLORIDE SERPL-SCNC: 107 MMOL/L (ref 101–111)
CO2 SERPL-SCNC: 25 MMOL/L (ref 21–32)
CREAT SERPLBLD-SCNC: 1.1 MG/DL (ref 0.6–1.2)
EOSINOPHIL # BLD AUTO: 0.1 10^3/UL (ref 0–0.7)
EOSINOPHIL NFR BLD AUTO: 2.2 %
ERYTHROCYTE [DISTWIDTH] IN BLOOD BY AUTOMATED COUNT: 16.7 % (ref 12–15)
FOLATE SERPL-MCNC: 9.74 NG/ML
GFRSERPLBLD MDRD-ARVRAT: 65 ML/MIN/{1.73_M2} (ref 89–?)
GLUCOSE SERPL-MCNC: 110 MG/DL (ref 70–100)
HCT VFR BLD AUTO: 33.3 % (ref 42–52)
HGB UR QL STRIP: 10.7 G/DL (ref 14–18)
INR PPP: 1.3 (ref 0.8–1.2)
IRON SATN MFR SERPL: 19 % (ref 20–50)
IRON SERPL-MCNC: 52 UG/DL (ref 45–182)
LYMPHOCYTES # SPEC AUTO: 0.9 10^3/UL (ref 1.5–3.5)
LYMPHOCYTES NFR BLD AUTO: 19.5 %
MAGNESIUM SERPL-MCNC: 2 MG/DL (ref 1.7–2.8)
MCH RBC QN AUTO: 38.2 PG (ref 27–31)
MCHC RBC AUTO-ENTMCNC: 32.1 G/DL (ref 32–36)
MCV RBC AUTO: 118.9 FL (ref 80–94)
MONOCYTES # BLD AUTO: 0.7 10^3/UL (ref 0–1)
MONOCYTES NFR BLD AUTO: 14.6 %
NEUTROPHILS # BLD AUTO: 2.8 10^3/UL (ref 1.5–6.6)
NEUTROPHILS # SNV AUTO: 4.5 X10^3/UL (ref 4.8–10.8)
NEUTROPHILS NFR BLD AUTO: 62.6 %
NRBC # BLD AUTO: 0 /100WBC
NRBC # BLD AUTO: 0 X10^3/UL
PDW BLD AUTO: 10.6 FL (ref 7.4–11.4)
PLAT MORPH BLD: (no result)
PLATELET # BLD: 119 10^3/UL (ref 130–450)
PLATELET BLD QL SMEAR: (no result)
POTASSIUM SERPL-SCNC: 4.3 MMOL/L (ref 3.5–5)
PROTHROM ACT/NOR PPP: 14.5 SECS (ref 9.9–12.6)
RBC MAR: 2.8 10^6/UL (ref 4.7–6.1)
RBC MORPH BLD: (no result)
SODIUM SERPLBLD-SCNC: 140 MMOL/L (ref 135–145)
TIBC SERPL-MCNC: 273 UG/DL (ref 250–450)
TRANSFERRIN SERPL-MCNC: 195 MG/DL (ref 180–329)
VIT B12 SERPL-MCNC: 663 PG/ML (ref 180–914)
WBC MORPH BLD: (no result)

## 2023-01-16 RX ADMIN — SODIUM CHLORIDE, PRESERVATIVE FREE SCH ML: 5 INJECTION INTRAVENOUS at 05:30

## 2023-01-16 RX ADMIN — DILTIAZEM HYDROCHLORIDE SCH EACH: 120 CAPSULE, COATED, EXTENDED RELEASE ORAL at 16:45

## 2023-01-16 RX ADMIN — PANTOPRAZOLE SODIUM SCH MG: 40 TABLET, DELAYED RELEASE ORAL at 05:30

## 2023-01-16 RX ADMIN — SODIUM CHLORIDE, PRESERVATIVE FREE SCH ML: 5 INJECTION INTRAVENOUS at 09:15

## 2023-01-16 RX ADMIN — MIDODRINE HYDROCHLORIDE SCH MG: 2.5 TABLET ORAL at 11:59

## 2023-01-16 RX ADMIN — MIDODRINE HYDROCHLORIDE SCH MG: 2.5 TABLET ORAL at 16:42

## 2023-01-16 RX ADMIN — PANTOPRAZOLE SODIUM SCH MG: 40 TABLET, DELAYED RELEASE ORAL at 16:40

## 2023-01-16 RX ADMIN — Medication SCH MG: at 09:15

## 2023-01-16 RX ADMIN — DILTIAZEM HYDROCHLORIDE SCH EACH: 120 CAPSULE, COATED, EXTENDED RELEASE ORAL at 08:52

## 2023-01-16 RX ADMIN — MIDODRINE HYDROCHLORIDE SCH MG: 2.5 TABLET ORAL at 08:50

## 2023-01-16 NOTE — DISCHARGE PLAN
Discharge Plan


Problem Reviewed?: Yes


Disposition: 01 Home, Self Care


Condition: Serious


Prescriptions: 


Bacitracin Zinc Oint [Bacitracin] 1 packet TOP BID #15 packet


Midodrine [ProAmantine] 10 mg PO TIDWM #90 tablet


Pantoprazole [Protonix] 40 mg PO 0730,1630 #60 tab


Thiamine [Vitamin B-1] 100 mg PO DAILY #30 tab


Diet: Regular


Activity Restrictions: Activity as Tolerated


Shower Restrictions: No


Driving Restrictions: Yes (No driving due to fainting, until cleared by MD)


Assistance Devices: Walker


Weight Bearing: Full Weight


Instruction Topics:  Hypertension Pulmonary, ED Hypotension Orthostatic


Health Concerns: 


You were admitted to the hospital after syncope (a fainting spell).  You have 

had a history of other near-fainting and fainting spells. We found that it is 

caused by you having "orthostatic hypotension".  This means that your blood 

pressure drops excessively when you are in a vertical position.  The treatment 

for this is to wear compression stockings on your legs when you are awake, and 

to not get dehydrated. Also, you have been started on a new medication called 

Midodrine to help raise your blood pressure.  This new prescription was 

electronically sent to your pharmacy.





An Echo was done that shows you have "Cor Pulmonale".  This means that your 

right heart is very dilated and very weak. I spoke to Dr. Ceballos, the partner 

of Dr. Pruitt (your Pulmonologist) and their office will be contacting you to 

come in soon for further management of this. Keep taking the Ofev medicine.





You were tested to see if you need an order for home oxygen and you do. At rest 

your oxygen levels are fine and you do not need oxygen when you sit or when you 

sleep, but with any activity (even walking across the room), you need to wear 

oxygen set at 3L/min. Please inform your Pulmonologist of this when you see him.





It is advised that you not drink more than 1 to 2 glasses of wine per day, 

higher amounts can give you a liver failure, add to your anemia and low platelet

count and also it will immediately dilate your blood vessels on your skin, which

will cause a blood pressure drop. In addition, alcohol makes you urinate and 

therefore it causes dehydration. You need to stay well-hydrated; use your thirst

as a guide and do not skip fluid intake to try to not urinate at night. Keep the

commode by your bed and continue to wait a minute before rising from a seated 

position.





You are now medically RESTRICTED FROM DRIVING A CAR because of these severe dr

ops in your blood pressure, and because of having several fainting spells.  You 

must have a release from your doctor to resume driving. The police force takes 

this medical restriction very seriously, and will enforce it.





You now have some pressure wounds: On your left heel, on the side of your right 

heel, and your left lower back on your buttock. You should have Bacitracin 

ointment applied there and a bandage placed over the open wounds and this should

be changed once a day. Prescription was ordered for Bacitracin.





All new medications were electronically sent to [facility] pharmacy in [facility].





Plan of Treatment: 


As above.





Care Goals: 


Improvement in symptoms and stabilization are the goals.





Assessment: 


The patient and wife at bedside understand and are in agreement with the plan.





Additional Instructions or Follow Up instructions: 


If you have new or worsening symptoms, call Dr. Terry or Dr. Pruitt's office 

for advice, or come to the ER.





No Smoking: If you smoke, Please STOP!  Call [phone] for help.


Follow-up with: 


Alejandrina Terry MD [Primary Care Provider] -

## 2023-01-16 NOTE — DISCHARGE SUMMARY
Discharge Summary


Discharge Date: 01/16/23


Discharging Provider: Dr Genia Miller


Primary Care Provider: Dr Kacey Terry


Code Status: Do Not Attempt Resuscitation


Condition at Discharge: Serious


Discharge Disposition: 01 Home, Self Care





- HPI


History of Present Illness: 





This is a 77-year-old white male with a history of pulmonary fibrosis Dx 76 mos 

ago and on Ofev and followed by Dr Pruitt (Pulmonologist in [place]), BPH 

previously on Afluzosin, history of HTN previously on lisinopril. He had a 

stress test 6 mos ago, which was felt to be normal; it was done for clearance 

for shoulder replacement which he underwent successfully 6 months.  He developed

leg edema and worsening shortness of breath over the past 3 weeks. He does not 

have a history of CHF and these symptoms have not been worked up. He has had 

prior recurrent syncope and near syncope, some felt to be micturition syncope, 

and he has only had a work-up including a 14-day heart monitor done 3 mos ago. 

The heart monitor showed episodes of SVT and he was put on metoprolol but never 

started it.  





History from wife:


Patient's wife says that she was in bed next to the patient last night, when she

heard the sound of his cane being knocked over; she looked over to find the 

patient was on the edge of the bed hunched over and barely able to support 

himself.  She thus came around to the side of the bed to help support patient, 

noting that he was barely responding verbally and that he felt like he was "dead

weight".  She says that very rapidly he became unresponsive as she maneuvered 

him to gradually get his head back on the pillow, but legs were still dangling. 

She then called 911 and while waiting for EMS was instructed by 911 to start 

CPR, which the patient's wife tried to do with 1 hand while speaking to 911, 

holding the phone in the other hand. EMS arrived to find patient was awakening 

and blood pressure was in the 100 syst range, he was in regular rhythm on 

cardiac monitor, heart rate 60s-70s. FSBS by EMS was 121. 


The patient does recall having the urge to urinate and sitting up on the edge of

the bed.  But the next thing he remembers, is the EMTs standing over him. 





Patient and his wife say that he has had many episodes of syncope and near 

syncope in the past and thus knows that when he goes to stand up from lying 

down, he needs to first sit up on the edge of the bed and wait for a while to 

make sure he is not feeling lightheaded, otherwise he "sees stars".  He then 

normally would transition over to his bedside commode. Because of these events, 

his PCP has taken him off the Lisinopril and the BPH med.





Outside records reviewed: 14-day Holter monitor done September 2022 which showed

sinus rhythm, rate , rare PVC and non-sustained VT, 18 runs of SVT at 

rates of 146 and the longest was 16 seconds





In the ED the patient had a blood pressure of 122/93, heart rate in the 80s in 

NSR, O2 saturation 95% on room air.  A chest x-ray showed CHF. BNP elevated at 

1790 and there has never been a prior BNP done here.  He had 2 troponins done 

that are elevated at 70 and 73 but are flat. The ED provider did a bedside IVC 

eval. saw plethora and he received a dose of 40 mg of Lasix. The ED doctor 

reached out to the Hospitalist team to have this patient placed in Observation f

or further evaluation and management of syncope and new onset of CHF.





I discussed his CODE STATUS and the patient wants to be a DNR/DNI.








- HOSPITAL COURSE


Hospital Course: 





(1) Syncope


The patient has had recurrent syncope.  We are told he has a history of 

micturition syncope and also that he needs to equilibrate his blood pressure 

before going from sitting to standing.  His lisinopril and a flu Zosyn have been

stopped because they add to his low blood pressure. Severe orthostatic 

hypotension was documented after he was admitted: Standing blood pressure was 69

mmHg. He received one 500 cc saline bolus on his first night, after receiving 

Lasix in the ED.  He was advised not to become dehydrated.  He was advised to 

restart using his compression stockings during the day, off at bedtime.  He was 

started on Midodrine 2.5 mg TID with meals and orthostatics were checked 

frequently while here. The Midodrine dose had to be increased to 5mg TID and 

finally 10mg TID. He was discharged on new Midodrine 10 mg p.o. TID with meals. 

He was given a medical RESTRICTION FROM DRIVING because of recurrent syncope and

severe orthostasis.





(2) Orthostatic hypotension


As above.


With his cor pulmonale, his blood pressure will be very sensitive to volume 

status; he should not be over diuresed or become dehydrated, since that will 

decrease LV preload and decrease cardiac output and lead to hypotension. We did 

not continue Lasix. Leg elevation corrected his ankle edema. He was seen by 

physical therapy, was able to ambulate with a walker which was newly prescribed 

for him at discharge.





(3) Pressure ulcers of skin of multiple topographic sites (L89.90)


The patient sleeps on his back, never on his side.  He had strict bedrest orders

unless assisted OOB by staff, so he was mostly supine in bed here, and  he 

developed some pressure skin changes: an unopen pressure sore on the left heel, 

another on the right lateral side of his right heel, and an open sore on the 

left buttock near his spine. These were treated with bacitracin zinc topical 

ointment, bandaged, advised to be changed once daily





(4) Abnormal EKG


His EKG is extremely abnormal and shows RBBB and an extreme right axis 

deviation. These findings suggest severe cor pulmonale. He had a similar EKG in 

9/2022. Therefore, I suspected that his normal stress test and normal Echo, 

which he and the wife reported was longer than 6 mos ago. He needs a complete 

Echo with Doppler and "bubble" study.





(5) ILD


His ER chest x-ray was read as CHF and he received Lasix 40 mg IV.  The chest x-

ray likely has that appearance from chronic interstitial lung disease and not 

from pulmonary volume overload. No more Lasix was given in fact he got a saline 

bolus after admission. 


He sees Dr Pruitt, Pulmonologist. Pulmonary fibrosis or ILD was Dx 6 mos ago an

d he was started on Ofev, which we continued as "patient's own med" here. We got

his PFT report and confirmed that he has ILD and not COPD, and that there was 

"no benefit after getting the bronchodilator", which the wife reported, so no 

inhalers were prescribed. 


On the day of discharge, the patient underwent an oximetry test:  Patient was 

not hypoxic at rest, with room air O2 sats of 92%.  He was hypoxic with 

ambulation on room air, with O2 sats of 87%. On 2 L/min during ambulation, his 

O2 sats were 88%, and finally on 3 L/min his O2 sats were 90%.  I am ordering 

home O2. He can be on room air at rest and should be on 3 L/min with exertion to

treat his pulmonary fibrosis/ILD.





(6) Pulmonary hypertension with chronic cor pulmonale


I personally performed a bedside Echo with Doppler on this patient, on 1/14/23. 

The Echo showed that he has severe cor pulmonale (his RV is 5-6 times the size 

of his LV and has poor RV function).  He also has severe pulm hypertension with 

PA pressure 100 mmHg. The prognosis with this finding is very poor, less than 1 

year survival rate.


We saw the Holter report. SVT was seen on the Holter done in 9/2022. Atrial 

tachy-dysrhythmias are very common with pulmonary pathologies. He was prescribed

to take metoprolol but never started it. He saw Dr Rankin of Cardiology in 

[place] only once.


We did not give aggressive diuresis since his LV cardiac output, and therefore 

his BP, is very volume sensitive. In fact he reported that he had recently 

decreased how much fluid he drinks, in order to not get up as often to urinate 

at night.  This likely added to his LV preload reduction and orthostasis.


He needs a complete Echo with Doppler and with "bubble" study to recalculate his

PA pressure, RV chamber size, and to evaluate for an ASD or PFO. If he has 

right-to-left shunting causing systemic hypoxia, he has a extremely poor 

prognosis.


On the day of discharge, I called Dr. Pruitt's partner and spoke to Dr. Ceballos, to give a warm hand-off. We discussed the findings of his Echo and his 

entire hospital course.  The last Echo, which the patient said was 6 mos ago, 

and had apparently cleared him to undergo shoulder surgery with general 

anesthesia 6 months ago, was not done 6 months ago but was actually done 

November 2021 and back then he had a normal RV with normal RV function, Dr Ceballos told me while we spoke. Therefore these changes with severe RV dilation,

severe cor pulmonale and pulm hypertension have occurred between November 2022 

and now. Dr Ceballos will make sure that the patient will be contacted to have a 

pulmonary appointment in the next week.





(7) Pancytopenia


The wife told me at admission that the patient has a Hematologist who keeps an 

eye on his pancytopenia. We checked iron stores, B12 and folate levels and these

were normal. His low platelet count and macrocytic anemia could be from alcohol 

abuse (see#9).





(8) Macrocytic anemia


His MCV is 120. His B12 and Folate levels were normal.





(9) Alcohol abuse


Patient told me he drinks 2 glasses of wine per day.  He told our staff that he 

drinks 5 glasses of wine per day. This is likely why he has a very high MCV of 

120 and low plts. He was put on daily thiamine 100 mg p.o. and discharged home 

with this.  A CIWA protocol was in place while he was here, he did not score 

above 2, but on the day of discharge, he was ready to be discharged in the 

morning and seemed very eager to get home. He was advised not to drink more than

1-2 alcoholic drinks per day since alcohol will dehydrate him and since the 

vasodilation from alcohol will also drop his blood pressure.





(10) BPH


He has complaints of incomplete emptying.  He used to be on a BPH med but it was

stopped since it added to hypotension





(11) GERD


He was put on Protonix








- ALLERGIES


Allergies/Adverse Reactions: 


                                    Allergies











Allergy/AdvReac Type Severity Reaction Status Date / Time


 


No Known Drug Allergies Allergy   Verified 01/16/23 20:01














- MEDICATIONS


Home Medications: 


                                Ambulatory Orders











 Medication  Instructions  Recorded  Confirmed


 


Nintedanib Esylate [Ofev] 100 mg PO BID 09/05/22 01/14/23


 


Bacitracin Zinc Oint [Bacitracin] 1 packet TOP BID #15 packet 01/16/23 


 


Midodrine [ProAmantine] 10 mg PO TIDWM #90 tablet 01/16/23 


 


Pantoprazole [Protonix] 40 mg PO 0730,1630 #60 tab 01/16/23 


 


Thiamine [Vitamin B-1] 100 mg PO DAILY #30 tab 01/16/23 














- PHYSICAL EXAM AT DISCHARGE


General Appearance: positive: No acute distress, Alert, Other (Nose and cheeks 

are flushed. He has severe kyphoscoliosis when he is upright)


Eyes Bilateral: positive: Normal inspection, EOMI, Other (No icterus, no 

nystagmus)


ENT: positive: ENT inspection nml, No signs of dehydration


Neck: positive: Nml inspection, Thyroid nml, No JVD


Respiratory: positive: No respiratory distress (no tachypnea at rest), Rhonchi


Cardiovascular: positive: Regular rate & rhythm, Systolic murmur, Other (His PMI

is vertically displaced and feels diffuse)


Abdomen: positive: Non-tender, Nml bowel sounds, No distention


Skin: positive: Warm, Dry, Other (Pressure sore on left heel, pressure sore on 

lateral right heel, open pressure sore on left buttock)


Extremities: positive: Non-tender, No pedal edema


Neurologic/Psychiatric: positive: Oriented x3, Motor nml, Other (No ataxia, no 

tremor.)





- LABS


Result Diagrams: 


                                 01/16/23 05:35





                                 01/16/23 05:35





- DIAGNOSTIC IMAGING


Diagnostic Imaging Results: Final report reviewed





- FOLLOW UP


Follow Up: 





See pulmonologist, Dr. Pruitt in the next few days. See primary care provider 

Dr. Kacey Terry in 1 to 2 weeks for hospital follow-up visit.








- TIME SPENT


Time Spent in Discharge (Minutes): 55